# Patient Record
Sex: MALE | Race: WHITE | NOT HISPANIC OR LATINO | URBAN - METROPOLITAN AREA
[De-identification: names, ages, dates, MRNs, and addresses within clinical notes are randomized per-mention and may not be internally consistent; named-entity substitution may affect disease eponyms.]

---

## 2023-03-31 ENCOUNTER — OFFICE VISIT (OUTPATIENT)
Dept: PEDIATRICS | Facility: CLINIC | Age: 23
End: 2023-03-31
Payer: COMMERCIAL

## 2023-03-31 VITALS — HEART RATE: 86 BPM | DIASTOLIC BLOOD PRESSURE: 73 MMHG | SYSTOLIC BLOOD PRESSURE: 127 MMHG

## 2023-03-31 DIAGNOSIS — S09.90XA MINOR HEAD INJURY, INITIAL ENCOUNTER: Primary | ICD-10-CM

## 2023-03-31 PROBLEM — G80.1 SPASTIC DIPLEGIC CEREBRAL PALSY (MULTI): Status: ACTIVE | Noted: 2023-03-31

## 2023-03-31 PROBLEM — J45.909 ASTHMA (HHS-HCC): Status: ACTIVE | Noted: 2023-03-31

## 2023-03-31 PROBLEM — F39 MOOD DISORDER (CMS-HCC): Status: RESOLVED | Noted: 2020-01-22 | Resolved: 2023-03-31

## 2023-03-31 PROBLEM — L02.611 CELLULITIS AND ABSCESS OF TOE OF RIGHT FOOT: Status: RESOLVED | Noted: 2020-01-22 | Resolved: 2023-03-31

## 2023-03-31 PROBLEM — F41.9 ANXIETY DISORDER: Status: ACTIVE | Noted: 2023-03-31

## 2023-03-31 PROBLEM — H10.45 CHRONIC ALLERGIC CONJUNCTIVITIS: Status: ACTIVE | Noted: 2023-03-31

## 2023-03-31 PROBLEM — J30.9 ALLERGIC RHINITIS, UNSPECIFIED: Status: RESOLVED | Noted: 2017-07-11 | Resolved: 2023-03-31

## 2023-03-31 PROBLEM — G47.10 HYPERSOMNIA: Status: ACTIVE | Noted: 2023-03-31

## 2023-03-31 PROBLEM — F84.0 AUTISM SPECTRUM DISORDER (HHS-HCC): Status: ACTIVE | Noted: 2023-03-31

## 2023-03-31 PROBLEM — G96.00 CSF LEAK: Status: RESOLVED | Noted: 2018-08-05 | Resolved: 2023-03-31

## 2023-03-31 PROBLEM — F42.9 OBSESSIVE COMPULSIVE DISORDER: Status: ACTIVE | Noted: 2023-03-31

## 2023-03-31 PROBLEM — J30.9 ALLERGIC RHINITIS: Status: ACTIVE | Noted: 2023-03-31

## 2023-03-31 PROBLEM — L03.031 CELLULITIS AND ABSCESS OF TOE OF RIGHT FOOT: Status: RESOLVED | Noted: 2020-01-22 | Resolved: 2023-03-31

## 2023-03-31 PROBLEM — K59.00 CONSTIPATION: Status: ACTIVE | Noted: 2023-03-31

## 2023-03-31 PROBLEM — N50.0 ATROPHIC TESTICLE: Status: ACTIVE | Noted: 2023-03-31

## 2023-03-31 PROBLEM — J45.30 MILD PERSISTENT ASTHMA, WELL CONTROLLED (HHS-HCC): Status: ACTIVE | Noted: 2017-07-11

## 2023-03-31 PROCEDURE — 99213 OFFICE O/P EST LOW 20 MIN: CPT | Performed by: PEDIATRICS

## 2023-03-31 RX ORDER — BACLOFEN 20 MG/1
TABLET ORAL
COMMUNITY
Start: 2018-10-26 | End: 2023-05-11 | Stop reason: SDUPTHER

## 2023-03-31 RX ORDER — TRIAMCINOLONE ACETONIDE 55 UG/1
SPRAY, METERED NASAL
COMMUNITY
Start: 2018-04-17 | End: 2023-10-16 | Stop reason: WASHOUT

## 2023-03-31 RX ORDER — HYDROCODONE BITARTRATE AND ACETAMINOPHEN 7.5; 325 MG/15ML; MG/15ML
5 SOLUTION ORAL
COMMUNITY
Start: 2015-06-04 | End: 2023-10-16 | Stop reason: WASHOUT

## 2023-03-31 RX ORDER — POLYETHYLENE GLYCOL 3350 17 G/17G
POWDER, FOR SOLUTION ORAL
COMMUNITY
Start: 2022-11-08 | End: 2023-04-28 | Stop reason: SDUPTHER

## 2023-03-31 RX ORDER — SERTRALINE HYDROCHLORIDE 100 MG/1
TABLET, FILM COATED ORAL
COMMUNITY
Start: 2020-06-18 | End: 2023-10-05 | Stop reason: SDUPTHER

## 2023-03-31 RX ORDER — PSEUDOEPH/DM/GUAIFEN/ACETAMIN 30-10-324
EXPECTORANT ORAL
COMMUNITY
Start: 2022-06-27 | End: 2023-10-16 | Stop reason: WASHOUT

## 2023-03-31 RX ORDER — AZELASTINE 1 MG/ML
SPRAY, METERED NASAL EVERY 12 HOURS PRN
COMMUNITY
Start: 2017-07-11 | End: 2023-10-16 | Stop reason: WASHOUT

## 2023-03-31 RX ORDER — TOOTHBRUSH
EACH DENTAL
COMMUNITY
Start: 2022-06-28 | End: 2024-03-12 | Stop reason: SDUPTHER

## 2023-03-31 RX ORDER — ALBUTEROL SULFATE 90 UG/1
2 AEROSOL, METERED RESPIRATORY (INHALATION) EVERY 4 HOURS PRN
COMMUNITY
Start: 2013-07-26 | End: 2023-10-16 | Stop reason: WASHOUT

## 2023-03-31 RX ORDER — FLUVOXAMINE MALEATE 50 MG/1
50 TABLET, FILM COATED ORAL 2 TIMES DAILY
COMMUNITY
Start: 2017-06-05 | End: 2023-10-05 | Stop reason: WASHOUT

## 2023-03-31 RX ORDER — BISMUTH SUBSALICYLATE 525 MG/30ML
LIQUID ORAL
COMMUNITY
Start: 2022-06-27 | End: 2023-10-16 | Stop reason: WASHOUT

## 2023-03-31 RX ORDER — DOCUSATE SODIUM 100 MG/1
CAPSULE, LIQUID FILLED ORAL
COMMUNITY
End: 2023-04-18 | Stop reason: SDUPTHER

## 2023-03-31 RX ORDER — DIMETHICONE, CAMPHOR (SYNTHETIC), MENTHOL, AND PHENOL 1.1; .5; .625; .5 G/100G; G/100G; G/100G; G/100G
OINTMENT TOPICAL
COMMUNITY
Start: 2022-06-28 | End: 2024-03-12 | Stop reason: SDUPTHER

## 2023-03-31 RX ORDER — DIPHENHYDRAMINE HCL 25 MG/1
TABLET ORAL
COMMUNITY
Start: 2022-06-27 | End: 2024-03-04 | Stop reason: WASHOUT

## 2023-03-31 RX ORDER — GABAPENTIN 100 MG/1
CAPSULE ORAL
COMMUNITY
Start: 2021-02-01 | End: 2023-10-05 | Stop reason: SDUPTHER

## 2023-03-31 RX ORDER — ASCORBIC ACID 1000 MG
TABLET ORAL
COMMUNITY
Start: 2022-06-28 | End: 2024-03-12 | Stop reason: SDUPTHER

## 2023-03-31 RX ORDER — BENZOCAINE 20 %
PASTE (GRAM) MUCOUS MEMBRANE
COMMUNITY
Start: 2022-06-28 | End: 2024-03-12 | Stop reason: SDUPTHER

## 2023-03-31 RX ORDER — MONTELUKAST SODIUM 10 MG/1
TABLET ORAL
COMMUNITY
Start: 2018-08-24 | End: 2023-05-15 | Stop reason: SDUPTHER

## 2023-03-31 RX ORDER — DIAZEPAM 2 MG/1
2 TABLET ORAL
COMMUNITY
Start: 2015-06-08 | End: 2023-10-16 | Stop reason: WASHOUT

## 2023-03-31 RX ORDER — CLOTRIMAZOLE 1 %
CREAM (GRAM) TOPICAL
COMMUNITY
Start: 2022-06-27 | End: 2023-10-16 | Stop reason: WASHOUT

## 2023-03-31 RX ORDER — GUAIFENESIN AND DEXTROMETHORPHAN HYDROBROMIDE 600; 30 MG/1; MG/1
TABLET, EXTENDED RELEASE ORAL
COMMUNITY
Start: 2022-06-27 | End: 2024-03-12 | Stop reason: SDUPTHER

## 2023-03-31 RX ORDER — ACETAMINOPHEN 325 MG/1
TABLET ORAL
COMMUNITY
Start: 2022-06-27 | End: 2024-03-12 | Stop reason: SDUPTHER

## 2023-04-17 NOTE — PROGRESS NOTES
Called patient and informed her.  She is scheduled for 4/18/2023 at 4/19/2023 for a virtual. Subjective   Patient ID: Paco Adams is a 22 y.o. male who presents for Concussion (Fell and hit, vom).  Here with manager from group home  2 nights ago, fell while in tub, hit head.  No LOC.  Had one bout of vomting 1/2 hr after episode.  No vomitng since  Sleeping well.  Did normal activities yesterday      Objective   /73   Pulse 86   BSA: There is no height or weight on file to calculate BSA.  Growth percentiles: Facility age limit for growth %piter is 20 years. Facility age limit for growth %piter is 20 years.     Physical Exam  PERRL, EOMI.  Ears nl.  No adenpathy. No bony tenderness or step off periorbital area.  No bruising, swelling    Assessment/Plan minor head trauma.  Has done well in past 36hours and reassuring exam  Supportive care  Tests ordered:  No orders of the defined types were placed in this encounter.    Tests reviewed:  Prescription drug management:     Pio Henry MD

## 2023-04-18 DIAGNOSIS — K59.00 CONSTIPATION, UNSPECIFIED CONSTIPATION TYPE: Primary | ICD-10-CM

## 2023-04-18 RX ORDER — DOCUSATE SODIUM 100 MG/1
CAPSULE, LIQUID FILLED ORAL
Qty: 60 CAPSULE | Refills: 11 | Status: SHIPPED | OUTPATIENT
Start: 2023-04-18 | End: 2024-03-12 | Stop reason: SDUPTHER

## 2023-04-26 ENCOUNTER — TELEPHONE (OUTPATIENT)
Dept: PEDIATRICS | Facility: CLINIC | Age: 23
End: 2023-04-26
Payer: COMMERCIAL

## 2023-04-26 DIAGNOSIS — K59.00 CONSTIPATION, UNSPECIFIED CONSTIPATION TYPE: Primary | ICD-10-CM

## 2023-04-28 RX ORDER — POLYETHYLENE GLYCOL 3350 17 G/17G
POWDER, FOR SOLUTION ORAL
Qty: 850 G | Refills: 11 | Status: SHIPPED | OUTPATIENT
Start: 2023-04-28 | End: 2024-03-12 | Stop reason: SDUPTHER

## 2023-05-03 DIAGNOSIS — J30.89 SEASONAL ALLERGIC RHINITIS DUE TO OTHER ALLERGIC TRIGGER: ICD-10-CM

## 2023-05-03 RX ORDER — CETIRIZINE HYDROCHLORIDE 10 MG/1
TABLET ORAL
Qty: 90 TABLET | Refills: 1 | Status: SHIPPED | OUTPATIENT
Start: 2023-05-03 | End: 2024-03-12 | Stop reason: SDUPTHER

## 2023-05-05 ENCOUNTER — TELEPHONE (OUTPATIENT)
Dept: PEDIATRICS | Facility: CLINIC | Age: 23
End: 2023-05-05
Payer: COMMERCIAL

## 2023-05-05 NOTE — TELEPHONE ENCOUNTER
Discussion about incident.  He did have vomiting x1 after incident.  I told person from group [facility] that that symptoms is concerning for more serious intracranial injury and in general warrents notification of a physician to help decide disposition

## 2023-05-11 DIAGNOSIS — G80.1 SPASTIC DIPLEGIC CEREBRAL PALSY (MULTI): Primary | ICD-10-CM

## 2023-05-11 RX ORDER — BACLOFEN 20 MG/1
20 TABLET ORAL 3 TIMES DAILY
Qty: 90 TABLET | Refills: 11 | Status: SHIPPED | OUTPATIENT
Start: 2023-05-11 | End: 2024-03-12 | Stop reason: SDUPTHER

## 2023-05-15 ENCOUNTER — OFFICE VISIT (OUTPATIENT)
Dept: PRIMARY CARE | Facility: CLINIC | Age: 23
End: 2023-05-15
Payer: COMMERCIAL

## 2023-05-15 VITALS
TEMPERATURE: 97.6 F | BODY MASS INDEX: 24.32 KG/M2 | HEIGHT: 65 IN | WEIGHT: 146 LBS | OXYGEN SATURATION: 94 % | DIASTOLIC BLOOD PRESSURE: 81 MMHG | SYSTOLIC BLOOD PRESSURE: 132 MMHG | HEART RATE: 90 BPM

## 2023-05-15 DIAGNOSIS — J45.909 ASTHMA, UNSPECIFIED ASTHMA SEVERITY, UNSPECIFIED WHETHER COMPLICATED, UNSPECIFIED WHETHER PERSISTENT (HHS-HCC): ICD-10-CM

## 2023-05-15 DIAGNOSIS — J30.9 ALLERGIC RHINITIS, UNSPECIFIED SEASONALITY, UNSPECIFIED TRIGGER: Primary | ICD-10-CM

## 2023-05-15 DIAGNOSIS — F84.0 AUTISM SPECTRUM DISORDER (HHS-HCC): ICD-10-CM

## 2023-05-15 DIAGNOSIS — Z00.00 HEALTHCARE MAINTENANCE: ICD-10-CM

## 2023-05-15 DIAGNOSIS — G80.1 SPASTIC DIPLEGIC CEREBRAL PALSY (MULTI): ICD-10-CM

## 2023-05-15 PROCEDURE — 99395 PREV VISIT EST AGE 18-39: CPT | Performed by: STUDENT IN AN ORGANIZED HEALTH CARE EDUCATION/TRAINING PROGRAM

## 2023-05-15 PROCEDURE — 1036F TOBACCO NON-USER: CPT | Performed by: STUDENT IN AN ORGANIZED HEALTH CARE EDUCATION/TRAINING PROGRAM

## 2023-05-15 RX ORDER — MONTELUKAST SODIUM 10 MG/1
10 TABLET ORAL NIGHTLY
Qty: 90 TABLET | Refills: 3 | Status: SHIPPED | OUTPATIENT
Start: 2023-05-15 | End: 2024-03-12 | Stop reason: SDUPTHER

## 2023-05-15 RX ORDER — FLUTICASONE PROPIONATE 110 UG/1
2 AEROSOL, METERED RESPIRATORY (INHALATION) EVERY 12 HOURS PRN
COMMUNITY
Start: 2017-07-11 | End: 2024-03-12 | Stop reason: SDUPTHER

## 2023-05-15 NOTE — PROGRESS NOTES
"Subjective   Patient ID: Paco Adams is a 23 y.o. male who presents for Establish Care.    HPI   Former patient of Dr. Pio Henry, now establishing with an adult provider instead of pediatrics. Grateful for the referral.     Complex Pmhx including diplegic cerebral palsy, autism, OCD/anxiety    Re: CP - see PM+R notes. Needs new bilateral AFOs to help him walk. Used to have baclofen pump; this was complicated by infection requiring surgery, now just on oral baclofen and gets therapy regularly. Has myoclonus on exam and needs walking crutches and AFOs to ambulate.    Re: Psych - h/o autism. On several medications, follows with Dr. Curtis. Stable on current medications. Lives in group home, has essentially 24hr care.    Re: Other - has a small rash on dorsal aspect of foot, appears to be athelete's foot. Also needs refills on his seasonal allergy/asthma medications    Shots are current.     Review of Systems    Objective   /81   Pulse 90   Temp 36.4 °C (97.6 °F)   Ht 1.651 m (5' 5\")   Wt 66.2 kg (146 lb)   SpO2 94%   BMI 24.30 kg/m²     Physical Exam  Gen: well developed in NAD. AAO x3.   HEENT: NC/AT. Anicteric sclera, symmetric pupils. MMM no thrush.  Neck: Soft, supple. No LAD. No goiter.   CV: RRR nl s1s2 no m/r/g  Pulm: CTAB no w/r/r, good air exchange  GI: soft NTND BS+ no hsm. Well healed scars from prior surgeries.   Ext: WWP no edema  Neuro: brisk bilateral reflexes UE and LE. Myoclonus on ankle jerk bilaterally   MSK: Spastic muscles throughout, worse LE.   Gait:  unsteady gait with crutches and AFOs  Psych: fair insight. Tangential discussions however.    Assessment/Plan   Longstanding chronic issues stemming from CP, autism, and OCD/anxiety. Well managed on current meds. Meds renewed. Lab work ordered. Shots up to date. Paperwork filled out.     Problem List Items Addressed This Visit       Allergic rhinitis - Primary    Relevant Medications    montelukast (Singulair) 10 mg " tablet    Asthma    Relevant Medications    montelukast (Singulair) 10 mg tablet    Autism spectrum disorder    Spastic diplegic cerebral palsy (CMS/HCC)     Other Visit Diagnoses       Healthcare maintenance        Relevant Orders    CBC and Auto Differential    Comprehensive Metabolic Panel    Lipid Panel

## 2023-05-15 NOTE — PATIENT INSTRUCTIONS
Please stop at the lab ([address]) to complete your blood and/or urine work that I've ordered for you.    I will contact you with the results at my soonest convenience. I strongly urge you to use [employer] as this is the quickest and easiest way to access your results and recieve my correspondences.    I have renewed your medications today     I have provided hand scripts for AFOs and for the appropriate dosing of miralax

## 2023-08-29 RX ORDER — CICLOPIROX 80 MG/ML
SOLUTION TOPICAL NIGHTLY
COMMUNITY
Start: 2023-07-24 | End: 2023-10-16 | Stop reason: WASHOUT

## 2023-08-29 RX ORDER — LORAZEPAM 1 MG/1
1 TABLET ORAL NIGHTLY
COMMUNITY
Start: 2023-08-07 | End: 2023-10-05 | Stop reason: SDUPTHER

## 2023-10-03 ENCOUNTER — APPOINTMENT (OUTPATIENT)
Dept: PRIMARY CARE | Facility: CLINIC | Age: 23
End: 2023-10-03
Payer: COMMERCIAL

## 2023-10-05 ENCOUNTER — TELEMEDICINE (OUTPATIENT)
Dept: BEHAVIORAL HEALTH | Facility: CLINIC | Age: 23
End: 2023-10-05
Payer: COMMERCIAL

## 2023-10-05 DIAGNOSIS — G80.1 SPASTIC DIPLEGIC CEREBRAL PALSY (MULTI): ICD-10-CM

## 2023-10-05 DIAGNOSIS — F42.8 OTHER OBSESSIVE-COMPULSIVE DISORDERS: ICD-10-CM

## 2023-10-05 DIAGNOSIS — F41.1 GENERALIZED ANXIETY DISORDER: Primary | ICD-10-CM

## 2023-10-05 DIAGNOSIS — F84.0 AUTISM SPECTRUM DISORDER (HHS-HCC): ICD-10-CM

## 2023-10-05 PROBLEM — J45.909 ASTHMA (HHS-HCC): Status: ACTIVE | Noted: 2017-07-11

## 2023-10-05 PROCEDURE — 99215 OFFICE O/P EST HI 40 MIN: CPT | Performed by: PSYCHIATRY & NEUROLOGY

## 2023-10-05 RX ORDER — LORAZEPAM 1 MG/1
1 TABLET ORAL NIGHTLY
Qty: 31 TABLET | Refills: 2 | Status: SHIPPED | OUTPATIENT
Start: 2023-10-05 | End: 2023-12-04 | Stop reason: SDUPTHER

## 2023-10-05 RX ORDER — SERTRALINE HYDROCHLORIDE 100 MG/1
50 TABLET, FILM COATED ORAL 3 TIMES DAILY
Qty: 47 TABLET | Refills: 2 | Status: SHIPPED | OUTPATIENT
Start: 2023-10-05 | End: 2023-12-04 | Stop reason: SDUPTHER

## 2023-10-05 RX ORDER — GABAPENTIN 100 MG/1
200 CAPSULE ORAL 2 TIMES DAILY
Qty: 124 CAPSULE | Refills: 2 | Status: SHIPPED | OUTPATIENT
Start: 2023-10-05 | End: 2023-12-04 | Stop reason: SDUPTHER

## 2023-10-05 ASSESSMENT — ENCOUNTER SYMPTOMS
SPEECH DIFFICULTY: 0
TREMORS: 0
SLEEP DISTURBANCE: 0
AGITATION: 0
ABDOMINAL PAIN: 0
SEIZURES: 0
ACTIVITY CHANGE: 0
SHORTNESS OF BREATH: 0

## 2023-10-05 NOTE — PATIENT INSTRUCTIONS
Evert seems to be doing well lately - no plans to make treatment changes.    Evert needs to have a routine urine drug screen done for prescribing of Ativan.    Next appointment: Monday 12/04/2023 at 10:30 AM virtual.

## 2023-10-05 NOTE — PROGRESS NOTES
A HIPAA-compliant interactive audio and video telecommunication system which permits real time communications between the patient (at the originating site) and provider (at the distant site) was utilized to provide this telehealth service.     The patient, family, caregivers and guardian (as appropriate) have provided consent on this date to conduct treatment via this telehealth service.  The patient's identity and physical location were verified at the time of this visit.      Present for appointment: Evert and [facility] staff ([place]).    SUBJECTIVE    No significant health problems for Evert since we last met.  He has remained on the scheduled/daily dose of lorazepam 1mg at bedtime since our last appointment and the general report from his family and group home staff is that he is doing very well lately.  He seems to be in a great mood, his anxiety is very minimal, and he is sleeping better overall.  It does not seem that these benefits have decreased with the duration of treatment.  He is still attending day program 4 days per week.  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  Though he does not participate in [employer] he will go with his house-mate on Wednesdays and socializes with others that are there.  Evert says he likes his home, his staff, and his room-mate.   He still does things through [employer]: online [employer] club [employer].    The most significant change for him is that he has to wake up almost 1.5 hours earlier on the days he goes to day program due to a change in transportation provider, so he's sometimes a little more tired in the mornings than before, but otherwise has adapted well to the change.    Review of Systems   Constitutional:  Negative for activity change.   Respiratory:  Negative for shortness of breath.    Cardiovascular:  Negative for chest pain.   Gastrointestinal:  Negative for abdominal pain.    Neurological:  Negative for tremors, seizures and speech difficulty.   Psychiatric/Behavioral:  Negative for agitation, behavioral problems and sleep disturbance.       Controlled Substance Evaluation  OARRS/PDMP reviewed: Tenzin Brown MD on 10/5/2023 11:31 AM  Is the patient prescribed a combination of a benzodiazepine and opioid? No  I have personally reviewed the OARRS report for Paco Adams.   I have considered the risks of abuse, dependence, addiction and diversion.    I believe that it is clinically appropriate for Paco Adams to be prescribed this medication.    Last Urine Drug Screen: Ordered 10/05/2023    Controlled Substance Agreement: 8/08/2023  Reviewed Controlled Substance Agreement, including but not limited to:  Benefits, risks, and alternatives to treatment with a controlled substance medication(s).    Prescribed Controlled Substances: Lorazepam  What is the patient's goal of therapy? Sleep, anxiety  Is this being achieved with current treatment? Yes  Activities of Daily Living:   Is your overall impression that this patient is benefiting (symptom reduction outweighs side effects) from therapy? Yes  1. Physical Functioning: Same  2. Family Relationship: Better  3. Social Relationship: Better  4. Mood: Better  5. Sleep Patterns: Better  6. 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   Bx Specialist ?   Nicotine None   Alcohol None   Other drugs None     OBJECTIVE    Lab Results   Component Value Date    HGB 15.3 05/07/2019     05/07/2019    NEUTROABS 2.89 05/07/2019    CHOL 148 05/07/2019    LDLF 80 05/07/2019    TRIG 132  05/07/2019     Therapeutic Drug Monitoring  N/A    Electrocardiograms  None in chart    MENTAL STATUS EXAM  General/Appearance: Appropriate dress/hygiene/grooming.  Attitude/Behavior: Actively engages with interview.  Speech/Communication: Hypertalkative. Interrupts or talks over others. Pressured.  Motor: Hyperactive. Stereotypies.  Gait: Not assessed at this visit.  Mood:  Seems to be very happy/excited today.  Affect: Euphoric. Expansive.  Thought processes: Perseverative.  Thought content: Obsessions. Talks about various elevator-filming scenarios.  Perception: Does not appear to be experiencing or responding to hallucinations.  Sensorium/Cognition: Alert, awake, oriented to person/place/time. Difficulty shifting focus/attention between topics.  Memory: Recent & remote recall is intact.  Insight: Minimal.  Judgment: Fair.    ASSESSMENT  Evert has been doing much better since resuming regular use of bedtime lorazepam.  He is sleeping better, and his mood and anxiety levels during the daytime have also improved.  While we may not want to keep him on this treatment long-term, for now it seems quite helpful.  Other options we could consider in place of the lorazepam might be a trial of an alpha agonist (clonidine or guanfacine) in the future.  I have ordered a UDS for ongoing prescribing of the lorazepam.    PROBLEM LIST  1. ASD  2. BIANCA  3. OCD    PLAN  -- Continue lorazepam 1mg at bedtime for anxiety and OCD  -- Continue sertraline 50mg TID for anxiety and OCD  -- Continue gabapentin 200mg BID for anxiety  -- Follow up 2 months

## 2023-10-16 ENCOUNTER — OFFICE VISIT (OUTPATIENT)
Dept: PRIMARY CARE | Facility: CLINIC | Age: 23
End: 2023-10-16
Payer: COMMERCIAL

## 2023-10-16 VITALS
TEMPERATURE: 98.7 F | SYSTOLIC BLOOD PRESSURE: 126 MMHG | OXYGEN SATURATION: 95 % | HEART RATE: 65 BPM | DIASTOLIC BLOOD PRESSURE: 81 MMHG

## 2023-10-16 DIAGNOSIS — G80.1 SPASTIC DIPLEGIC CEREBRAL PALSY (MULTI): ICD-10-CM

## 2023-10-16 DIAGNOSIS — R21 RASH: Primary | ICD-10-CM

## 2023-10-16 DIAGNOSIS — J45.909 ASTHMA, UNSPECIFIED ASTHMA SEVERITY, UNSPECIFIED WHETHER COMPLICATED, UNSPECIFIED WHETHER PERSISTENT (HHS-HCC): ICD-10-CM

## 2023-10-16 DIAGNOSIS — F84.0 AUTISM SPECTRUM DISORDER (HHS-HCC): ICD-10-CM

## 2023-10-16 PROCEDURE — 90471 IMMUNIZATION ADMIN: CPT | Performed by: STUDENT IN AN ORGANIZED HEALTH CARE EDUCATION/TRAINING PROGRAM

## 2023-10-16 PROCEDURE — 99214 OFFICE O/P EST MOD 30 MIN: CPT | Performed by: STUDENT IN AN ORGANIZED HEALTH CARE EDUCATION/TRAINING PROGRAM

## 2023-10-16 PROCEDURE — 90686 IIV4 VACC NO PRSV 0.5 ML IM: CPT | Performed by: STUDENT IN AN ORGANIZED HEALTH CARE EDUCATION/TRAINING PROGRAM

## 2023-10-16 PROCEDURE — 1036F TOBACCO NON-USER: CPT | Performed by: STUDENT IN AN ORGANIZED HEALTH CARE EDUCATION/TRAINING PROGRAM

## 2023-10-16 RX ORDER — TRIAMCINOLONE ACETONIDE 1 MG/G
CREAM TOPICAL 2 TIMES DAILY
Qty: 453.6 G | Refills: 0 | Status: SHIPPED | OUTPATIENT
Start: 2023-10-16 | End: 2023-10-18 | Stop reason: SDUPTHER

## 2023-10-16 NOTE — PROGRESS NOTES
Subjective   Patient ID: Paco Adams is a 23 y.o. male who presents for No chief complaint on file..    HPI   Complex Pmhx including diplegic cerebral palsy, autism, OCD/anxiety    Doing well since last in. Needs refills on a few chronic meds.      Re: CP - see PM+R notes. Needs new bilateral AFOs to help him walk. Used to have baclofen pump; this was complicated by infection requiring surgery, now just on oral baclofen and gets therapy regularly. Has myoclonus on exam and needs walking crutches and AFOs to ambulate.     Re: Psych - h/o autism. On several medications, follows with Dr. Curtis. Stable on current medications. Lives in group home, has essentially 24hr care.    PMHx, FHx, Social Hx, Surg Hx personally reviewed at this appointment. No pertinent findings and/or changes from prior (if applicable)..    ROS: Denies wt gain/loss f/c HA LoC CP SOB NVDC. See HPI above, and scanned sheet (if applicable). All other systems are reviewed and are without complaint.     Review of Systems    Objective   /81   Pulse 65   Temp 37.1 °C (98.7 °F)   SpO2 95%     Physical Exam  Gen: well developed in NAD. AAO x3.   HEENT: NC/AT. Anicteric sclera, symmetric pupils. MMM no thrush.  Neck: Soft, supple. No LAD. No goiter.   CV: RRR nl s1s2 no m/r/g  Pulm: CTAB no w/r/r, good air exchange  GI: soft NTND BS+ no hsm. Well healed scars from prior surgeries.   Ext: WWP no edema  Neuro: brisk bilateral reflexes UE and LE. Myoclonus on ankle jerk bilaterally   MSK: Spastic muscles throughout, worse LE.   Gait:  unsteady gait with crutches and AFOs  Psych: fair insight. Tangential discussions however.  Skin: two areas (back of head, R foot dorsal aspect) with mild dermatitis.     Lab Results   Component Value Date    WBC 5.3 05/07/2019    HGB 15.3 05/07/2019    HCT 45.4 05/07/2019     05/07/2019    CHOL 148 05/07/2019    TRIG 132 05/07/2019    HDL 41.8 05/07/2019     par          Assessment/Plan    Longstanding chronic issues stemming from CP, autism, and OCD/anxiety. Well managed on current meds. Dermatitis will be treated with a steroid cream. Meds renewed. Lab work ordered at prior appointment, to go to lab to get this done. Flu shot given. Paperwork filled out.      # Dermatitis  - steroid cream prescribed  - referral to dermatology if does not improve with this    # Cerebral Palsy with spasticity  - continue current meds  - use AFOs to walk  - follow up PM&R    # Depression and/or Anxiety  - continue current meds  - follow up psych     # Health Maintenance  - routine blood work  - Colon Cancer Screening: not indicated   - PSA:  not indicated   - Immunizations:  flu shot  - AAA screening:  not indicated          Patient was identified as a fall risk. Risk prevention instructions provided.

## 2023-10-16 NOTE — PATIENT INSTRUCTIONS
Please stop at the lab ([address]) to complete your blood and/or urine work that I've ordered for you.    I will contact you with the results at my soonest convenience. I strongly urge you to use [employer] as this is the quickest and easiest way to access your results and receive my correspondences.     You received your flu shot today!    I have renewed your chronic medications today, and given you a steroid cream.      Ways to Help Prevent Falls at Home    Quick Tips   ? Ask for help if you need it. Most people want to help!   ? Get up slowly after sitting or laying down   ? Wear a medical alert device or keep cell phone in your pocket   ? Use night lights, especially areas near a bathroom   ? Keep the items you use often within reach on a small stool or end table   ? Use an assistive device such as walker or cane, as directed by provider/physical therapy   ? Use a non-slip mat and grab bars in your bathroom. Look for home health sections for best options     Other Areas to Focus On   ? Exercise and nutrition: Regular exercise or taking a falls prevention class are great ways improve strength and balance. Don’t forget to stay hydrated and bring a snack!   ? Medicine side effects: Some medicines can make you sleepy or dizzy, which could cause a fall. Ask your healthcare provider about the side effects your medicines could cause. Be sure to let them know if you take any vitamins or supplements as well.   ? Tripping hazards: Remove items you could trip on, such as loose mats, rugs, cords, and clutter. Wear closed toe shoes with rubber soles.   ? Health and wellness: Get regular checkups with your healthcare provider, plus routine vision and hearing screenings. Talk with your healthcare provider about:   o Your medicines and the possible side effects - bring them in a bag if that is easier!   o Problems with balance or feeling dizzy   o Ways to promote bone health, such as Vitamin D and calcium supplements   o  Questions or concerns about falling     *Ask your healthcare team if you have questions     ©[facility], 2022

## 2023-10-18 ENCOUNTER — LAB (OUTPATIENT)
Dept: LAB | Facility: LAB | Age: 23
End: 2023-10-18
Payer: COMMERCIAL

## 2023-10-18 DIAGNOSIS — Z00.00 HEALTHCARE MAINTENANCE: ICD-10-CM

## 2023-10-18 DIAGNOSIS — F41.1 GENERALIZED ANXIETY DISORDER: ICD-10-CM

## 2023-10-18 DIAGNOSIS — R21 RASH: ICD-10-CM

## 2023-10-18 LAB
ALBUMIN SERPL BCP-MCNC: 4.7 G/DL (ref 3.4–5)
ALP SERPL-CCNC: 61 U/L (ref 33–120)
ALT SERPL W P-5'-P-CCNC: 15 U/L (ref 10–52)
AMPHETAMINES UR QL SCN: NORMAL
ANION GAP SERPL CALC-SCNC: 11 MMOL/L (ref 10–20)
AST SERPL W P-5'-P-CCNC: 19 U/L (ref 9–39)
BARBITURATES UR QL SCN: NORMAL
BASOPHILS # BLD AUTO: 0.02 X10*3/UL (ref 0–0.1)
BASOPHILS NFR BLD AUTO: 0.6 %
BILIRUB SERPL-MCNC: 0.6 MG/DL (ref 0–1.2)
BUN SERPL-MCNC: 12 MG/DL (ref 6–23)
BZE UR QL SCN: NORMAL
CALCIUM SERPL-MCNC: 9.7 MG/DL (ref 8.6–10.3)
CANNABINOIDS UR QL SCN: NORMAL
CHLORIDE SERPL-SCNC: 105 MMOL/L (ref 98–107)
CHOLEST SERPL-MCNC: 175 MG/DL (ref 0–199)
CHOLESTEROL/HDL RATIO: 3.5
CO2 SERPL-SCNC: 26 MMOL/L (ref 21–32)
CREAT SERPL-MCNC: 0.68 MG/DL (ref 0.5–1.3)
CREAT UR-MCNC: 127.2 MG/DL (ref 20–370)
EOSINOPHIL # BLD AUTO: 0.05 X10*3/UL (ref 0–0.7)
EOSINOPHIL NFR BLD AUTO: 1.4 %
ERYTHROCYTE [DISTWIDTH] IN BLOOD BY AUTOMATED COUNT: 12.7 % (ref 11.5–14.5)
GFR SERPL CREATININE-BSD FRML MDRD: >90 ML/MIN/1.73M*2
GLUCOSE SERPL-MCNC: 85 MG/DL (ref 74–99)
HCT VFR BLD AUTO: 49.6 % (ref 41–52)
HDLC SERPL-MCNC: 50.7 MG/DL
HGB BLD-MCNC: 16.5 G/DL (ref 13.5–17.5)
IMM GRANULOCYTES # BLD AUTO: 0.01 X10*3/UL (ref 0–0.7)
IMM GRANULOCYTES NFR BLD AUTO: 0.3 % (ref 0–0.9)
LDLC SERPL CALC-MCNC: 113 MG/DL
LYMPHOCYTES # BLD AUTO: 0.84 X10*3/UL (ref 1.2–4.8)
LYMPHOCYTES NFR BLD AUTO: 23.5 %
MCH RBC QN AUTO: 31.1 PG (ref 26–34)
MCHC RBC AUTO-ENTMCNC: 33.3 G/DL (ref 32–36)
MCV RBC AUTO: 94 FL (ref 80–100)
MONOCYTES # BLD AUTO: 0.42 X10*3/UL (ref 0.1–1)
MONOCYTES NFR BLD AUTO: 11.8 %
NEUTROPHILS # BLD AUTO: 2.23 X10*3/UL (ref 1.2–7.7)
NEUTROPHILS NFR BLD AUTO: 62.4 %
NON HDL CHOLESTEROL: 124 MG/DL (ref 0–149)
NRBC BLD-RTO: 0 /100 WBCS (ref 0–0)
PCP UR QL SCN: NORMAL
PLATELET # BLD AUTO: 253 X10*3/UL (ref 150–450)
PMV BLD AUTO: 11.1 FL (ref 7.5–11.5)
POTASSIUM SERPL-SCNC: 4 MMOL/L (ref 3.5–5.3)
PROT SERPL-MCNC: 7.2 G/DL (ref 6.4–8.2)
RBC # BLD AUTO: 5.3 X10*6/UL (ref 4.5–5.9)
SODIUM SERPL-SCNC: 138 MMOL/L (ref 136–145)
TRIGL SERPL-MCNC: 57 MG/DL (ref 0–149)
VLDL: 11 MG/DL (ref 0–40)
WBC # BLD AUTO: 3.6 X10*3/UL (ref 4.4–11.3)

## 2023-10-18 PROCEDURE — 80061 LIPID PANEL: CPT

## 2023-10-18 PROCEDURE — 80354 DRUG SCREENING FENTANYL: CPT

## 2023-10-18 PROCEDURE — 80365 DRUG SCREENING OXYCODONE: CPT

## 2023-10-18 PROCEDURE — 80368 SEDATIVE HYPNOTICS: CPT

## 2023-10-18 PROCEDURE — 80361 OPIATES 1 OR MORE: CPT

## 2023-10-18 PROCEDURE — 85025 COMPLETE CBC W/AUTO DIFF WBC: CPT

## 2023-10-18 PROCEDURE — 80358 DRUG SCREENING METHADONE: CPT

## 2023-10-18 PROCEDURE — 80307 DRUG TEST PRSMV CHEM ANLYZR: CPT

## 2023-10-18 PROCEDURE — 82570 ASSAY OF URINE CREATININE: CPT

## 2023-10-18 PROCEDURE — 80373 DRUG SCREENING TRAMADOL: CPT

## 2023-10-18 PROCEDURE — 80053 COMPREHEN METABOLIC PANEL: CPT

## 2023-10-18 PROCEDURE — 36415 COLL VENOUS BLD VENIPUNCTURE: CPT

## 2023-10-18 PROCEDURE — 80346 BENZODIAZEPINES1-12: CPT

## 2023-10-18 RX ORDER — TRIAMCINOLONE ACETONIDE 1 MG/G
CREAM TOPICAL 2 TIMES DAILY
Qty: 453.6 G | Refills: 0 | Status: SHIPPED | OUTPATIENT
Start: 2023-10-18 | End: 2023-10-24

## 2023-10-19 DIAGNOSIS — R21 RASH: ICD-10-CM

## 2023-10-24 RX ORDER — TRIAMCINOLONE ACETONIDE 1 MG/G
CREAM TOPICAL 2 TIMES DAILY
Qty: 400 G | Refills: 1 | Status: SHIPPED | OUTPATIENT
Start: 2023-10-24 | End: 2023-12-07 | Stop reason: SDUPTHER

## 2023-10-27 LAB
1OH-MIDAZOLAM UR CFM-MCNC: <25 NG/ML
6MAM UR CFM-MCNC: <25 NG/ML
7AMINOCLONAZEPAM UR CFM-MCNC: <25 NG/ML
A-OH ALPRAZ UR CFM-MCNC: <25 NG/ML
ALPRAZ UR CFM-MCNC: <25 NG/ML
CHLORDIAZEP UR CFM-MCNC: <25 NG/ML
CLONAZEPAM UR CFM-MCNC: <25 NG/ML
CODEINE UR CFM-MCNC: <50 NG/ML
DIAZEPAM UR CFM-MCNC: <25 NG/ML
EDDP UR CFM-MCNC: <25 NG/ML
FENTANYL UR CFM-MCNC: <2.5 NG/ML
HYDROCODONE CTO UR CFM-MCNC: <25 NG/ML
HYDROMORPHONE UR CFM-MCNC: <25 NG/ML
LORAZEPAM UR CFM-MCNC: 655 NG/ML
METHADONE UR CFM-MCNC: <25 NG/ML
MIDAZOLAM UR CFM-MCNC: <25 NG/ML
MORPHINE UR CFM-MCNC: <50 NG/ML
NORDIAZEPAM UR CFM-MCNC: <25 NG/ML
NORFENTANYL UR CFM-MCNC: <2.5 NG/ML
NORHYDROCODONE UR CFM-MCNC: <25 NG/ML
NOROXYCODONE UR CFM-MCNC: <25 NG/ML
NORTRAMADOL UR-MCNC: <50 NG/ML
OXAZEPAM UR CFM-MCNC: <25 NG/ML
OXYCODONE UR CFM-MCNC: <25 NG/ML
OXYMORPHONE UR CFM-MCNC: <25 NG/ML
TEMAZEPAM UR CFM-MCNC: <25 NG/ML
TRAMADOL UR CFM-MCNC: <50 NG/ML
ZOLPIDEM UR CFM-MCNC: <25 NG/ML
ZOLPIDEM UR-MCNC: <25 NG/ML

## 2023-12-04 ENCOUNTER — OFFICE VISIT (OUTPATIENT)
Dept: BEHAVIORAL HEALTH | Facility: CLINIC | Age: 23
End: 2023-12-04
Payer: COMMERCIAL

## 2023-12-04 DIAGNOSIS — F84.0 AUTISM SPECTRUM DISORDER (HHS-HCC): ICD-10-CM

## 2023-12-04 DIAGNOSIS — F41.1 GENERALIZED ANXIETY DISORDER: ICD-10-CM

## 2023-12-04 DIAGNOSIS — F42.8 OTHER OBSESSIVE-COMPULSIVE DISORDERS: Primary | ICD-10-CM

## 2023-12-04 DIAGNOSIS — G80.1 SPASTIC DIPLEGIC CEREBRAL PALSY (MULTI): ICD-10-CM

## 2023-12-04 PROCEDURE — 99214 OFFICE O/P EST MOD 30 MIN: CPT | Mod: AM,95 | Performed by: PSYCHIATRY & NEUROLOGY

## 2023-12-04 PROCEDURE — 99214 OFFICE O/P EST MOD 30 MIN: CPT | Performed by: PSYCHIATRY & NEUROLOGY

## 2023-12-04 PROCEDURE — 1036F TOBACCO NON-USER: CPT | Performed by: PSYCHIATRY & NEUROLOGY

## 2023-12-04 RX ORDER — SERTRALINE HYDROCHLORIDE 100 MG/1
50 TABLET, FILM COATED ORAL 3 TIMES DAILY
Qty: 47 TABLET | Refills: 2 | Status: SHIPPED | OUTPATIENT
Start: 2023-12-04 | End: 2024-03-04 | Stop reason: SDUPTHER

## 2023-12-04 RX ORDER — LORAZEPAM 1 MG/1
1 TABLET ORAL NIGHTLY
Qty: 31 TABLET | Refills: 2 | Status: SHIPPED | OUTPATIENT
Start: 2023-12-04 | End: 2024-03-04 | Stop reason: SDUPTHER

## 2023-12-04 RX ORDER — GABAPENTIN 100 MG/1
200 CAPSULE ORAL 2 TIMES DAILY
Qty: 124 CAPSULE | Refills: 2 | Status: SHIPPED | OUTPATIENT
Start: 2023-12-04 | End: 2024-03-04 | Stop reason: SDUPTHER

## 2023-12-04 ASSESSMENT — ENCOUNTER SYMPTOMS
SHORTNESS OF BREATH: 0
AGITATION: 0
TREMORS: 0
SPEECH DIFFICULTY: 0
ABDOMINAL PAIN: 0
SEIZURES: 0
ACTIVITY CHANGE: 0
SLEEP DISTURBANCE: 0

## 2023-12-04 NOTE — PATIENT INSTRUCTIONS
Evert continues to do well with current treatments - no changes recommended at this time.  Next appointment: Monday 3/04/2024 at 10:30 AM jose.

## 2023-12-04 NOTE — PROGRESS NOTES
Outpatient Psychiatry    A HIPAA-compliant interactive audio and video telecommunication system which permits real time communications between the patient (at the originating site) and provider (at the distant site) was utilized to provide this telehealth service.     The patient, family, caregivers and guardian (as appropriate) have provided consent on this date to conduct treatment via this telehealth service.  The patient's identity and physical location were verified at the time of this visit.      Present for appointment: Evert and [facility] staff ([place]).    SUBJECTIVE    Evert has been doing well since we last met.  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  He will be starting 2 days/week at [employer] in 2024 (along with 2 days/week at [employer]).  He has remained on the scheduled/daily dose of lorazepam 1mg at bedtime since our last appointment and the general report is that it continues to be very helpful for managing anxiety.  He seems to be in a great mood, his anxiety is very minimal, and he is sleeping better overall.  It does not seem that these benefits have decreased with the duration of treatment.  Evert says he likes his home, his staff, and his room-mate.    Review of Systems   Constitutional:  Negative for activity change.   Respiratory:  Negative for shortness of breath.    Cardiovascular:  Negative for chest pain.   Gastrointestinal:  Negative for abdominal pain.   Neurological:  Negative for tremors, seizures and speech difficulty.   Psychiatric/Behavioral:  Negative for agitation, behavioral problems and sleep disturbance.       Controlled Substance Evaluation  OARRS/PDMP reviewed: Tenzin Brown MD on 12/4/2023  6:39 AM  Is the patient prescribed a combination of a benzodiazepine and opioid? No  I have personally reviewed the OARRS report for Paco Adams.   I have considered the risks  of abuse, dependence, addiction and diversion.    I believe that it is clinically appropriate for Paco Adams to be prescribed this medication.    Last Urine Drug Screen:  Recent Results (from the past 8760 hour(s))   Opiate/Opioid/Benzo Prescription Compliance Confirmation    Collection Time: 10/18/23  8:58 AM   Result Value Ref Range    Clonazepam <25 <25 ng/mL    7-Aminoclonazepam <25 <25 ng/mL    Alprazolam <25 <25 ng/mL    Alpha-Hydroxyalprazolam <25 <25 ng/mL    Midazolam <25 <25 ng/mL    Alpha-Hydroxymidazolam <25 <25 ng/mL    Chlordiazepoxide <25 <25 ng/mL    Diazepam <25 <25 ng/mL    Nordiazepam <25 <25 ng/mL    Temazepam <25 <25 ng/mL    Oxazepam <25 <25 ng/mL    Lorazepam 655 (H) <25 ng/mL    Methadone <25 <25 ng/mL    EDDP <25 <25 ng/mL    6-Acetylmorphine <25 <25 ng/mL    Codeine <50 <50 ng/mL    Hydrocodone <25 <25 ng/mL    Hydromorphone <25 <25 ng/mL    Morphine  <50 <50 ng/mL    Norhydrocodone <25 <25 ng/mL    Noroxycodone <25 <25 ng/mL    Oxycodone <25 <25 ng/mL    Oxymorphone <25 <25 ng/mL    Fentanyl <2.5 <2.5 ng/mL    Norfentanyl <2.5 <2.5 ng/mL    Tramadol <50 <50 ng/mL    O-Desmethyltramadol <50 <50 ng/mL    Zolpidem <25 <25 ng/mL    Zolpidem Metabolite (ZCA) <25 <25 ng/mL   Opiate/Opioid/Benzo Prescription Compliance Screen    Collection Time: 10/18/23  8:58 AM   Result Value Ref Range    Creatinine, Urine Random 127.2 20.0 - 370.0 mg/dL    Amphetamine Screen, Urine Presumptive Negative Presumptive Negative    Barbiturate Screen, Urine Presumptive Negative Presumptive Negative    Cannabinoid Screen, Urine Presumptive Negative Presumptive Negative    Cocaine Metabolite Screen, Urine Presumptive Negative Presumptive Negative    PCP Screen, Urine Presumptive Negative Presumptive Negative     Results as expected? Yes    Controlled Substance Agreement: 08/08/2023  Reviewed Controlled Substance Agreement, including but not limited to:  Benefits, risks, and alternatives to treatment with a  controlled substance medication(s).    Prescribed Controlled Substances:  Benzodiazepines: Lorazepam  What is the patient's goal of therapy? Sleep and reduced anxiety  Is this being achieved with current treatment? Yes  Activities of Daily Living:   Is your overall impression that this patient is benefiting (symptom reduction outweighs side effects) from benzodiazepine therapy? Yes   1. Physical Functioning: Same  2. Family Relationship: Same  3. Social Relationship: Same  4. Mood: Better  5. Sleep Patterns: Better  6. Overall Function: Better     MEDICATION HISTORY  Buspirone - over-activating, possible serotonin syndrome  Fluvoxamine - had been helpful in the past    CURRENT MEDICATIONS    Current Outpatient Medications:     acetaminophen (Tylenol) 325 mg tablet, , Disp: , Rfl:     baclofen (Lioresal) 20 mg tablet, Take 1 tablet (20 mg) by mouth 3 times a day., Disp: 90 tablet, Rfl: 11    Banophen 25 mg tablet, , Disp: , Rfl:     Blistex Medicated 0.6-0.5-1.1-0.5 % ointment ointment, , Disp: , Rfl:     Calamine Plus, pramox-calamin, 1-8 % lotion, , Disp: , Rfl:     cetirizine (ZyrTEC) 10 mg tablet, TAKE 1 TABLET BY MOUTH WITH BREAKFAST, Disp: 90 tablet, Rfl: 1    Cough Drops 5.8 mg lozenge, , Disp: , Rfl:     docusate sodium (Colace) 100 mg capsule, 2 capsules by mouth daily, Disp: 60 capsule, Rfl: 11    fluticasone (Flovent) 110 mcg/actuation inhaler, Inhale 2 puffs every 12 hours if needed., Disp: , Rfl:     gabapentin (Neurontin) 100 mg capsule, Take 2 capsules (200 mg) by mouth 2 times a day., Disp: 124 capsule, Rfl: 2    LORazepam (Ativan) 1 mg tablet, Take 1 tablet (1 mg) by mouth once daily at bedtime., Disp: 31 tablet, Rfl: 2    Milk of Magnesia 400 mg/5 mL suspension, , Disp: , Rfl:     montelukast (Singulair) 10 mg tablet, Take 1 tablet (10 mg) by mouth once daily at bedtime., Disp: 90 tablet, Rfl: 3    Mucinex DM  mg 12 hr tablet, , Disp: , Rfl:     multivitamin with minerals  (multivitamin-iron-folic acid) tablet, Take by mouth once daily., Disp: , Rfl:     polyethylene glycol (Glycolax) 17 gram/dose powder, One half capful in 8 ounces of fluid daily, Disp: 850 g, Rfl: 11    sertraline (Zoloft) 100 mg tablet, Take 0.5 tablets (50 mg) by mouth 3 times a day., Disp: 47 tablet, Rfl: 2    triamcinolone (Kenalog) 0.1 % cream, Apply topically 2 times a day., Disp: 400 g, Rfl: 1     SOCIAL HISTORY  Living situation [facility]aiver home with 1 male house-mate   Provider agency [facility]   Work or day program [employer] 4 days/week (Tuesday-Friday)   School Graduated [employer]    Guardianship Parents are co-guardians   SSA ?   Bx Specialist ?   Nicotine None   Alcohol None   Other drugs None     OBJECTIVE    Lab Results   Component Value Date    HGB 16.5 10/18/2023     10/18/2023    NEUTROABS 2.23 10/18/2023    GLUCOSE 85 10/18/2023     10/18/2023    K 4.0 10/18/2023    CO2 26 10/18/2023    CALCIUM 9.7 10/18/2023    CREATININE 0.68 10/18/2023    AST 19 10/18/2023    ALT 15 10/18/2023    CHOL 175 10/18/2023    LDLF 80 05/07/2019    TRIG 57 10/18/2023     Therapeutic Drug Monitoring  N/A    Electrocardiograms  None in chart    MENTAL STATUS EXAM  General/Appearance: Appropriate dress/hygiene/grooming.  Attitude/Behavior: Actively engages with interview. Calm/pleasant.  Speech/Communication: Hypertalkative. Interrupts or talks over others.  Motor: Stimming.  Gait: Not assessed at this visit.  Mood: Appears to be in good spirits.  Affect: Expansive. Elevated. Excitable.  Thought processes: Perseverative.  Thought content: Obsessions.  Perception: Does not appear to be experiencing or responding to hallucinations.  Sensorium/Cognition: Alert, awake, oriented to person/place/time. Difficulty shifting between topics.  Memory: Recent & remote recall is intact.  Insight: Minimal.  Judgment: Fair. Behaviorally under control.     ASSESSMENT  Evert continues to do well with regular/scheduled use of bedtime  lorazepam.  He is sleeping better, and his mood and anxiety levels during the daytime have also improved.  We will re-evaluate the treatment efficacy at our next check-in.    PROBLEM LIST  ASD  BIANCA  OCD    PLAN  -- Continue lorazepam 1mg at bedtime for anxiety and OCD  -- Continue sertraline 50mg TID for anxiety and OCD  -- Continue gabapentin 200mg BID for anxiety  -- Follow up 3 months    Tenzin Brown MD    Prep time on date of the patient encounter: 5 minutes   Time spent directly with patient/family/caregiver: 25 minutes   Additional time spent on patient care activities: 0 minutes   Documentation time: 5 minutes   Other time spent: 0 minutes   Total time on date of patient encounter: 35 minutes

## 2023-12-07 DIAGNOSIS — R21 RASH: ICD-10-CM

## 2023-12-07 RX ORDER — TRIAMCINOLONE ACETONIDE 1 MG/G
CREAM TOPICAL 2 TIMES DAILY
Qty: 400 G | Refills: 1 | Status: SHIPPED | OUTPATIENT
Start: 2023-12-07 | End: 2023-12-08 | Stop reason: SDUPTHER

## 2023-12-08 DIAGNOSIS — R21 RASH: ICD-10-CM

## 2023-12-08 RX ORDER — TRIAMCINOLONE ACETONIDE 1 MG/G
CREAM TOPICAL 2 TIMES DAILY
Qty: 400 G | Refills: 1 | Status: SHIPPED | OUTPATIENT
Start: 2023-12-08 | End: 2024-01-11 | Stop reason: SDUPTHER

## 2024-01-11 DIAGNOSIS — R21 RASH: ICD-10-CM

## 2024-01-11 RX ORDER — TRIAMCINOLONE ACETONIDE 1 MG/G
CREAM TOPICAL 2 TIMES DAILY
Qty: 400 G | Refills: 2 | Status: SHIPPED | OUTPATIENT
Start: 2024-01-11 | End: 2024-03-12 | Stop reason: SDUPTHER

## 2024-03-04 ENCOUNTER — TELEMEDICINE (OUTPATIENT)
Dept: BEHAVIORAL HEALTH | Facility: CLINIC | Age: 24
End: 2024-03-04
Payer: COMMERCIAL

## 2024-03-04 DIAGNOSIS — G80.1 SPASTIC DIPLEGIC CEREBRAL PALSY (MULTI): ICD-10-CM

## 2024-03-04 DIAGNOSIS — F42.8 OTHER OBSESSIVE-COMPULSIVE DISORDERS: Primary | ICD-10-CM

## 2024-03-04 DIAGNOSIS — F84.0 AUTISM SPECTRUM DISORDER (HHS-HCC): ICD-10-CM

## 2024-03-04 DIAGNOSIS — F41.1 GENERALIZED ANXIETY DISORDER: ICD-10-CM

## 2024-03-04 PROCEDURE — 99214 OFFICE O/P EST MOD 30 MIN: CPT | Performed by: PSYCHIATRY & NEUROLOGY

## 2024-03-04 PROCEDURE — 1036F TOBACCO NON-USER: CPT | Performed by: PSYCHIATRY & NEUROLOGY

## 2024-03-04 RX ORDER — GABAPENTIN 100 MG/1
200 CAPSULE ORAL 2 TIMES DAILY
Qty: 124 CAPSULE | Refills: 2 | Status: SHIPPED | OUTPATIENT
Start: 2024-03-04 | End: 2024-03-12 | Stop reason: SDUPTHER

## 2024-03-04 RX ORDER — SERTRALINE HYDROCHLORIDE 100 MG/1
50 TABLET, FILM COATED ORAL 3 TIMES DAILY
Qty: 47 TABLET | Refills: 2 | Status: SHIPPED | OUTPATIENT
Start: 2024-03-04 | End: 2024-03-12 | Stop reason: SDUPTHER

## 2024-03-04 RX ORDER — LORAZEPAM 1 MG/1
1 TABLET ORAL NIGHTLY
Qty: 31 TABLET | Refills: 2 | Status: SHIPPED | OUTPATIENT
Start: 2024-03-04 | End: 2024-03-12 | Stop reason: SDUPTHER

## 2024-03-04 SDOH — ECONOMIC STABILITY: HOUSING INSECURITY
IN THE LAST 12 MONTHS, WAS THERE A TIME WHEN YOU DID NOT HAVE A STEADY PLACE TO SLEEP OR SLEPT IN A SHELTER (INCLUDING NOW)?: NO

## 2024-03-04 SDOH — ECONOMIC STABILITY: TRANSPORTATION INSECURITY
IN THE PAST 12 MONTHS, HAS LACK OF TRANSPORTATION KEPT YOU FROM MEETINGS, WORK, OR FROM GETTING THINGS NEEDED FOR DAILY LIVING?: NO

## 2024-03-04 SDOH — SOCIAL STABILITY: SOCIAL INSECURITY
WITHIN THE LAST YEAR, HAVE TO BEEN RAPED OR FORCED TO HAVE ANY KIND OF SEXUAL ACTIVITY BY YOUR PARTNER OR EX-PARTNER?: NO

## 2024-03-04 SDOH — HEALTH STABILITY: MENTAL HEALTH: HOW OFTEN DO YOU HAVE 6 OR MORE DRINKS ON ONE OCCASION?: NEVER

## 2024-03-04 SDOH — ECONOMIC STABILITY: FOOD INSECURITY: WITHIN THE PAST 12 MONTHS, THE FOOD YOU BOUGHT JUST DIDN'T LAST AND YOU DIDN'T HAVE MONEY TO GET MORE.: NEVER TRUE

## 2024-03-04 SDOH — ECONOMIC STABILITY: FOOD INSECURITY: WITHIN THE PAST 12 MONTHS, YOU WORRIED THAT YOUR FOOD WOULD RUN OUT BEFORE YOU GOT MONEY TO BUY MORE.: NEVER TRUE

## 2024-03-04 SDOH — ECONOMIC STABILITY: INCOME INSECURITY: HOW HARD IS IT FOR YOU TO PAY FOR THE VERY BASICS LIKE FOOD, HOUSING, MEDICAL CARE, AND HEATING?: NOT VERY HARD

## 2024-03-04 SDOH — HEALTH STABILITY: MENTAL HEALTH: HOW OFTEN DO YOU HAVE A DRINK CONTAINING ALCOHOL?: NEVER

## 2024-03-04 SDOH — SOCIAL STABILITY: SOCIAL INSECURITY: WITHIN THE LAST YEAR, HAVE YOU BEEN HUMILIATED OR EMOTIONALLY ABUSED IN OTHER WAYS BY YOUR PARTNER OR EX-PARTNER?: NO

## 2024-03-04 SDOH — HEALTH STABILITY: MENTAL HEALTH: HOW MANY STANDARD DRINKS CONTAINING ALCOHOL DO YOU HAVE ON A TYPICAL DAY?: PATIENT DOES NOT DRINK

## 2024-03-04 SDOH — SOCIAL STABILITY: SOCIAL INSECURITY
WITHIN THE LAST YEAR, HAVE YOU BEEN KICKED, HIT, SLAPPED, OR OTHERWISE PHYSICALLY HURT BY YOUR PARTNER OR EX-PARTNER?: NO

## 2024-03-04 SDOH — ECONOMIC STABILITY: INCOME INSECURITY: IN THE LAST 12 MONTHS, WAS THERE A TIME WHEN YOU WERE NOT ABLE TO PAY THE MORTGAGE OR RENT ON TIME?: NO

## 2024-03-04 SDOH — ECONOMIC STABILITY: HOUSING INSECURITY: IN THE LAST 12 MONTHS, HOW MANY PLACES HAVE YOU LIVED?: 1

## 2024-03-04 SDOH — ECONOMIC STABILITY: INCOME INSECURITY: IN THE PAST 12 MONTHS, HAS THE ELECTRIC, GAS, OIL, OR WATER COMPANY THREATENED TO SHUT OFF SERVICE IN YOUR HOME?: NO

## 2024-03-04 SDOH — SOCIAL STABILITY: SOCIAL INSECURITY: WITHIN THE LAST YEAR, HAVE YOU BEEN AFRAID OF YOUR PARTNER OR EX-PARTNER?: NO

## 2024-03-04 SDOH — ECONOMIC STABILITY: TRANSPORTATION INSECURITY
IN THE PAST 12 MONTHS, HAS THE LACK OF TRANSPORTATION KEPT YOU FROM MEDICAL APPOINTMENTS OR FROM GETTING MEDICATIONS?: NO

## 2024-03-04 ASSESSMENT — LIFESTYLE VARIABLES
AUDIT-C TOTAL SCORE: 0
SKIP TO QUESTIONS 9-10: 1

## 2024-03-04 ASSESSMENT — ENCOUNTER SYMPTOMS
ACTIVITY CHANGE: 0
ABDOMINAL PAIN: 0
AGITATION: 0
SLEEP DISTURBANCE: 0
SPEECH DIFFICULTY: 0
SEIZURES: 0
TREMORS: 0
SHORTNESS OF BREATH: 0

## 2024-03-04 NOTE — PROGRESS NOTES
Outpatient Psychiatry    A HIPAA-compliant interactive audio and video telecommunication system which permits real time communications between the patient (at the originating site) and provider (at the distant site) was utilized to provide this telehealth service.     The patient, family, caregivers and guardian (as appropriate) have provided consent on this date to conduct treatment via this telehealth service.  The patient's identity and physical location were verified at the time of this visit.      Present for appointment: Evert.    SUBJECTIVE    Doing well overall since last visit.  No significant updates or reports of mood/anxiety problems from Evert's family or [facility] staff.  He has not yet started attending PI (still going to [employer] 4 days per week).  He's looking forward to a visit to [employer] in [place] in June.  He is supposedly going to be taking a trip to [place] in April to meet up with one of his friends/idols in the [employer] community.  He has remained on the scheduled/daily dose of lorazepam 1mg at bedtime since our last appointment and the general report is that it continues to be very helpful for managing anxiety.  He seems to be in a great mood, his anxiety is very minimal, and he is sleeping better overall.      Review of Systems   Constitutional:  Negative for activity change.   Respiratory:  Negative for shortness of breath.    Cardiovascular:  Negative for chest pain.   Gastrointestinal:  Negative for abdominal pain.   Neurological:  Negative for tremors, seizures and speech difficulty.   Psychiatric/Behavioral:  Negative for agitation, behavioral problems and sleep disturbance.       Controlled Substance Evaluation  OARRS/PDMP reviewed: Tenzin Brown MD on 3/4/2024  6:56 AM  Is the patient prescribed a combination of a benzodiazepine and opioid? No  I have personally reviewed the OARRS report for Paco Adams.   I have considered the risks of  abuse, dependence, addiction and diversion.    I believe that it is clinically appropriate for Paco Adams to be prescribed this medication.    Last Urine Drug Screen:  Recent Results (from the past 8760 hour(s))   Opiate/Opioid/Benzo Prescription Compliance Confirmation    Collection Time: 10/18/23  8:58 AM   Result Value Ref Range    Clonazepam <25 <25 ng/mL    7-Aminoclonazepam <25 <25 ng/mL    Alprazolam <25 <25 ng/mL    Alpha-Hydroxyalprazolam <25 <25 ng/mL    Midazolam <25 <25 ng/mL    Alpha-Hydroxymidazolam <25 <25 ng/mL    Chlordiazepoxide <25 <25 ng/mL    Diazepam <25 <25 ng/mL    Nordiazepam <25 <25 ng/mL    Temazepam <25 <25 ng/mL    Oxazepam <25 <25 ng/mL    Lorazepam 655 (H) <25 ng/mL    Methadone <25 <25 ng/mL    EDDP <25 <25 ng/mL    6-Acetylmorphine <25 <25 ng/mL    Codeine <50 <50 ng/mL    Hydrocodone <25 <25 ng/mL    Hydromorphone <25 <25 ng/mL    Morphine  <50 <50 ng/mL    Norhydrocodone <25 <25 ng/mL    Noroxycodone <25 <25 ng/mL    Oxycodone <25 <25 ng/mL    Oxymorphone <25 <25 ng/mL    Fentanyl <2.5 <2.5 ng/mL    Norfentanyl <2.5 <2.5 ng/mL    Tramadol <50 <50 ng/mL    O-Desmethyltramadol <50 <50 ng/mL    Zolpidem <25 <25 ng/mL    Zolpidem Metabolite (ZCA) <25 <25 ng/mL   Opiate/Opioid/Benzo Prescription Compliance Screen    Collection Time: 10/18/23  8:58 AM   Result Value Ref Range    Creatinine, Urine Random 127.2 20.0 - 370.0 mg/dL    Amphetamine Screen, Urine Presumptive Negative Presumptive Negative    Barbiturate Screen, Urine Presumptive Negative Presumptive Negative    Cannabinoid Screen, Urine Presumptive Negative Presumptive Negative    Cocaine Metabolite Screen, Urine Presumptive Negative Presumptive Negative    PCP Screen, Urine Presumptive Negative Presumptive Negative     Results as expected? Yes    Controlled Substance Agreement: 08/08/2023  Reviewed Controlled Substance Agreement, including but not limited to:  Benefits, risks, and alternatives to treatment with a  controlled substance medication(s).    Prescribed Controlled Substances:  Benzodiazepines: Lorazepam  What is the patient's goal of therapy? Sleep and reduced anxiety  Is this being achieved with current treatment? Yes  Activities of Daily Living:   Is your overall impression that this patient is benefiting (symptom reduction outweighs side effects) from benzodiazepine therapy? Yes   1. Physical Functioning: Same  2. Family Relationship: Same  3. Social Relationship: Same  4. Mood: Better  5. Sleep Patterns: Better  6. Overall Function: Better     MEDICATION HISTORY  Buspirone - over-activating, possible serotonin syndrome  Fluvoxamine - had been helpful in the past    CURRENT MEDICATIONS    Current Outpatient Medications:     gabapentin (Neurontin) 100 mg capsule, Take 2 capsules (200 mg) by mouth 2 times a day., Disp: 124 capsule, Rfl: 2    LORazepam (Ativan) 1 mg tablet, Take 1 tablet (1 mg) by mouth once daily at bedtime., Disp: 31 tablet, Rfl: 2    sertraline (Zoloft) 100 mg tablet, Take 0.5 tablets (50 mg) by mouth 3 times a day., Disp: 47 tablet, Rfl: 2    acetaminophen (Tylenol) 325 mg tablet, , Disp: , Rfl:     baclofen (Lioresal) 20 mg tablet, Take 1 tablet (20 mg) by mouth 3 times a day., Disp: 90 tablet, Rfl: 11    Blistex Medicated 0.6-0.5-1.1-0.5 % ointment ointment, , Disp: , Rfl:     Calamine Plus, pramox-calamin, 1-8 % lotion, , Disp: , Rfl:     cetirizine (ZyrTEC) 10 mg tablet, TAKE 1 TABLET BY MOUTH WITH BREAKFAST, Disp: 90 tablet, Rfl: 1    Cough Drops 5.8 mg lozenge, , Disp: , Rfl:     docusate sodium (Colace) 100 mg capsule, 2 capsules by mouth daily, Disp: 60 capsule, Rfl: 11    fluticasone (Flovent) 110 mcg/actuation inhaler, Inhale 2 puffs every 12 hours if needed., Disp: , Rfl:     Milk of Magnesia 400 mg/5 mL suspension, , Disp: , Rfl:     montelukast (Singulair) 10 mg tablet, Take 1 tablet (10 mg) by mouth once daily at bedtime., Disp: 90 tablet, Rfl: 3    Mucinex DM  mg 12 hr  tablet, , Disp: , Rfl:     multivitamin with minerals (multivitamin-iron-folic acid) tablet, Take by mouth once daily., Disp: , Rfl:     polyethylene glycol (Glycolax) 17 gram/dose powder, One half capful in 8 ounces of fluid daily, Disp: 850 g, Rfl: 11    triamcinolone (Kenalog) 0.1 % cream, Apply topically 2 times a day., Disp: 400 g, Rfl: 2     SOCIAL HISTORY  Living situation Waiver home with 1 male house-mate   Provider agency [facility]   Work or day program [employer] 4 days/week (Tuesday-Friday)   School Graduated [employer]    Guardianship Parents are co-guardians   SSA ?   Bx Specialist ?   Nicotine None   Alcohol None   Other drugs None     OBJECTIVE    Lab Results   Component Value Date    HGB 16.5 10/18/2023     10/18/2023    NEUTROABS 2.23 10/18/2023    GLUCOSE 85 10/18/2023     10/18/2023    K 4.0 10/18/2023    CO2 26 10/18/2023    CALCIUM 9.7 10/18/2023    CREATININE 0.68 10/18/2023    AST 19 10/18/2023    ALT 15 10/18/2023    CHOL 175 10/18/2023    LDLF 80 05/07/2019    TRIG 57 10/18/2023     Therapeutic Drug Monitoring  N/A    Electrocardiograms  None in chart    MENTAL STATUS EXAM  General/Appearance: Appropriate dress/hygiene/grooming.  Attitude/Behavior: Actively engages with interview. Calm/pleasant.  Speech/Communication: Hypertalkative. Interrupts or talks over others.  Motor: Stimming.  Gait: Not assessed at this visit.  Mood: Appears to be in good spirits.  Affect: Excited at times.  Thought processes: Perseverative.  Thought content: Obsessions. Repetitively asks about places I've visited.  Perception: Does not appear to be experiencing or responding to hallucinations.  Sensorium/Cognition: Alert, awake, oriented to person/place/time. Difficulty shifting between topics.  Memory: Recent & remote recall is intact.  Insight: Minimal.  Judgment: Fair. Behaviorally under control.     ASSESSMENT  Evert continues to do well -- no indication to make treatment changes at this  time.    PROBLEM LIST  ASD  BIANCA  OCD    PLAN  -- Continue lorazepam 1mg at bedtime for anxiety and OCD  -- Continue sertraline 50mg TID for anxiety and OCD  -- Continue gabapentin 200mg BID for anxiety  -- Follow up 3 months    Tenzin Brown MD    Prep time on date of the patient encounter: 5 minutes   Time spent directly with patient/family/caregiver: 25 minutes   Additional time spent on patient care activities: 0 minutes   Documentation time: 5 minutes   Other time spent: 0 minutes   Total time on date of patient encounter: 35 minutes

## 2024-03-04 NOTE — PATIENT INSTRUCTIONS
Continue current medications at this time.    We will plan for follow-up in 3 months (June 2024).    Please note that due to prescribing of controlled medications, Paco Adams will need ONE face-to-face in-person office visit in 2024 to meet  compliance requirements.     Call [phone] to schedule next appointment at [facility] (Wednesday or Thursday).

## 2024-03-11 ENCOUNTER — TELEPHONE (OUTPATIENT)
Dept: PHYSICAL MEDICINE AND REHAB | Facility: CLINIC | Age: 24
End: 2024-03-11
Payer: COMMERCIAL

## 2024-03-11 DIAGNOSIS — R21 RASH: ICD-10-CM

## 2024-03-11 NOTE — TELEPHONE ENCOUNTER
----- Message from Ami Alan MD sent at 3/11/2024  1:48 PM EDT -----  Regarding: FW: Paco Angie (: 2000) - new pharmacy  Contact: [phone]  Please change the pharmacy and let her know.  Thank you  ----- Message -----  From: Paco Adams  Sent: 3/11/2024   1:46 PM EDT  To: [id number] Clinical Support Staff  Subject: Paco Adams (: 2000) - new ph#    Hi, Dr. Alan!  The pharmacy Evert currently uses will be closing soon.  I have put the information for his new pharmacy - [facility] in [place], [place] - in his chart but wasn't sure how to make it his new default.  I just wanted to make you aware of the change for refills and future prescriptions.     Thanks so much for taking such good care of him!  Kavya

## 2024-03-12 DIAGNOSIS — J30.9 ALLERGIC RHINITIS, UNSPECIFIED SEASONALITY, UNSPECIFIED TRIGGER: ICD-10-CM

## 2024-03-12 DIAGNOSIS — F42.8 OTHER OBSESSIVE-COMPULSIVE DISORDERS: ICD-10-CM

## 2024-03-12 DIAGNOSIS — F41.1 GENERALIZED ANXIETY DISORDER: ICD-10-CM

## 2024-03-12 DIAGNOSIS — J45.909 ASTHMA, UNSPECIFIED ASTHMA SEVERITY, UNSPECIFIED WHETHER COMPLICATED, UNSPECIFIED WHETHER PERSISTENT (HHS-HCC): ICD-10-CM

## 2024-03-12 DIAGNOSIS — G80.1 SPASTIC DIPLEGIC CEREBRAL PALSY (MULTI): ICD-10-CM

## 2024-03-12 DIAGNOSIS — K59.00 CONSTIPATION, UNSPECIFIED CONSTIPATION TYPE: ICD-10-CM

## 2024-03-12 DIAGNOSIS — J30.89 SEASONAL ALLERGIC RHINITIS DUE TO OTHER ALLERGIC TRIGGER: ICD-10-CM

## 2024-03-12 DIAGNOSIS — R21 RASH: ICD-10-CM

## 2024-03-12 RX ORDER — SERTRALINE HYDROCHLORIDE 100 MG/1
50 TABLET, FILM COATED ORAL 3 TIMES DAILY
Qty: 47 TABLET | Refills: 2 | Status: SHIPPED | OUTPATIENT
Start: 2024-03-12 | End: 2024-05-21 | Stop reason: SDUPTHER

## 2024-03-12 RX ORDER — FLUTICASONE PROPIONATE 110 UG/1
2 AEROSOL, METERED RESPIRATORY (INHALATION)
Qty: 12 G | Refills: 1 | Status: SHIPPED | OUTPATIENT
Start: 2024-03-12 | End: 2024-04-22

## 2024-03-12 RX ORDER — GUAIFENESIN AND DEXTROMETHORPHAN HYDROBROMIDE 600; 30 MG/1; MG/1
1 TABLET, EXTENDED RELEASE ORAL EVERY 12 HOURS PRN
Qty: 90 TABLET | Refills: 3 | Status: SHIPPED | OUTPATIENT
Start: 2024-03-12 | End: 2024-05-15 | Stop reason: SDUPTHER

## 2024-03-12 RX ORDER — BACLOFEN 20 MG/1
20 TABLET ORAL 3 TIMES DAILY
Qty: 90 TABLET | Refills: 11 | Status: SHIPPED | OUTPATIENT
Start: 2024-03-12 | End: 2024-05-15 | Stop reason: SDUPTHER

## 2024-03-12 RX ORDER — DIMETHICONE, CAMPHOR (SYNTHETIC), MENTHOL, AND PHENOL 1.1; .5; .625; .5 G/100G; G/100G; G/100G; G/100G
OINTMENT TOPICAL
Qty: 6 G | Refills: 11 | Status: SHIPPED | OUTPATIENT
Start: 2024-03-12 | End: 2024-05-15 | Stop reason: SDUPTHER

## 2024-03-12 RX ORDER — TOOTHBRUSH
EACH DENTAL
Qty: 80 LOZENGE | Refills: 3 | Status: SHIPPED | OUTPATIENT
Start: 2024-03-12 | End: 2024-05-15 | Stop reason: SDUPTHER

## 2024-03-12 RX ORDER — BENZOCAINE 20 %
PASTE (GRAM) MUCOUS MEMBRANE
Qty: 177 ML | Refills: 3 | Status: SHIPPED | OUTPATIENT
Start: 2024-03-12 | End: 2024-05-15 | Stop reason: SDUPTHER

## 2024-03-12 RX ORDER — ACETAMINOPHEN 325 MG/1
TABLET ORAL
Qty: 90 TABLET | Refills: 3 | Status: SHIPPED | OUTPATIENT
Start: 2024-03-12 | End: 2024-05-15 | Stop reason: SDUPTHER

## 2024-03-12 RX ORDER — ASCORBIC ACID 1000 MG
5 TABLET ORAL DAILY PRN
Qty: 360 ML | Refills: 11 | Status: SHIPPED | OUTPATIENT
Start: 2024-03-12 | End: 2024-05-15 | Stop reason: SDUPTHER

## 2024-03-12 RX ORDER — CETIRIZINE HYDROCHLORIDE 10 MG/1
10 TABLET ORAL
Qty: 90 TABLET | Refills: 1 | Status: SHIPPED | OUTPATIENT
Start: 2024-03-12 | End: 2024-05-15 | Stop reason: SDUPTHER

## 2024-03-12 RX ORDER — DOCUSATE SODIUM 100 MG/1
CAPSULE, LIQUID FILLED ORAL
Qty: 60 CAPSULE | Refills: 11 | Status: SHIPPED | OUTPATIENT
Start: 2024-03-12 | End: 2024-05-15 | Stop reason: SDUPTHER

## 2024-03-12 RX ORDER — LORAZEPAM 1 MG/1
1 TABLET ORAL NIGHTLY
Qty: 30 TABLET | Refills: 2 | Status: SHIPPED | OUTPATIENT
Start: 2024-03-12 | End: 2024-03-12 | Stop reason: SDUPTHER

## 2024-03-12 RX ORDER — GABAPENTIN 100 MG/1
200 CAPSULE ORAL 2 TIMES DAILY
Qty: 120 CAPSULE | Refills: 2 | Status: SHIPPED | OUTPATIENT
Start: 2024-03-12 | End: 2024-05-15 | Stop reason: SDUPTHER

## 2024-03-12 RX ORDER — GABAPENTIN 100 MG/1
200 CAPSULE ORAL 2 TIMES DAILY
Qty: 120 CAPSULE | Refills: 2 | Status: SHIPPED | OUTPATIENT
Start: 2024-03-12 | End: 2024-03-12 | Stop reason: SDUPTHER

## 2024-03-12 RX ORDER — POLYETHYLENE GLYCOL 3350 17 G/17G
POWDER, FOR SOLUTION ORAL
Qty: 850 G | Refills: 11 | Status: SHIPPED | OUTPATIENT
Start: 2024-03-12 | End: 2024-05-15 | Stop reason: SDUPTHER

## 2024-03-12 RX ORDER — LORAZEPAM 1 MG/1
1 TABLET ORAL NIGHTLY
Qty: 30 TABLET | Refills: 2 | Status: SHIPPED | OUTPATIENT
Start: 2024-03-12 | End: 2024-05-21

## 2024-03-12 RX ORDER — MONTELUKAST SODIUM 10 MG/1
10 TABLET ORAL NIGHTLY
Qty: 90 TABLET | Refills: 3 | Status: SHIPPED | OUTPATIENT
Start: 2024-03-12 | End: 2024-05-15 | Stop reason: SDUPTHER

## 2024-03-12 RX ORDER — TRIAMCINOLONE ACETONIDE 1 MG/G
CREAM TOPICAL 2 TIMES DAILY
Qty: 400 G | Refills: 2 | Status: SHIPPED | OUTPATIENT
Start: 2024-03-12 | End: 2024-05-15 | Stop reason: SDUPTHER

## 2024-03-19 ENCOUNTER — PATIENT MESSAGE (OUTPATIENT)
Dept: PRIMARY CARE | Facility: CLINIC | Age: 24
End: 2024-03-19
Payer: COMMERCIAL

## 2024-03-19 DIAGNOSIS — J45.40 MODERATE PERSISTENT ASTHMA, UNSPECIFIED WHETHER COMPLICATED (HHS-HCC): ICD-10-CM

## 2024-03-19 DIAGNOSIS — J30.9 ALLERGIC RHINITIS, UNSPECIFIED SEASONALITY, UNSPECIFIED TRIGGER: Primary | ICD-10-CM

## 2024-03-19 RX ORDER — ALBUTEROL SULFATE 90 UG/1
2 AEROSOL, METERED RESPIRATORY (INHALATION) EVERY 4 HOURS PRN
Qty: 8 G | Refills: 11 | Status: SHIPPED | OUTPATIENT
Start: 2024-03-19 | End: 2024-05-15 | Stop reason: SDUPTHER

## 2024-03-19 RX ORDER — TRIAMCINOLONE ACETONIDE 55 UG/1
2 SPRAY, METERED NASAL DAILY
Qty: 16.5 G | Refills: 11 | Status: SHIPPED | OUTPATIENT
Start: 2024-03-19 | End: 2024-05-15 | Stop reason: SDUPTHER

## 2024-04-19 DIAGNOSIS — G80.1 SPASTIC DIPLEGIC CEREBRAL PALSY (MULTI): ICD-10-CM

## 2024-04-22 RX ORDER — FLUTICASONE PROPIONATE 110 UG/1
2 AEROSOL, METERED RESPIRATORY (INHALATION) 2 TIMES DAILY
Qty: 12 G | Refills: 10 | Status: SHIPPED | OUTPATIENT
Start: 2024-04-22 | End: 2024-05-15 | Stop reason: WASHOUT

## 2024-05-15 ENCOUNTER — OFFICE VISIT (OUTPATIENT)
Dept: PRIMARY CARE | Facility: CLINIC | Age: 24
End: 2024-05-15
Payer: COMMERCIAL

## 2024-05-15 VITALS
HEART RATE: 97 BPM | BODY MASS INDEX: 21.83 KG/M2 | TEMPERATURE: 98.9 F | WEIGHT: 131 LBS | HEIGHT: 65 IN | SYSTOLIC BLOOD PRESSURE: 122 MMHG | DIASTOLIC BLOOD PRESSURE: 76 MMHG

## 2024-05-15 DIAGNOSIS — G80.1 SPASTIC DIPLEGIC CEREBRAL PALSY (MULTI): ICD-10-CM

## 2024-05-15 DIAGNOSIS — J45.909 ASTHMA, UNSPECIFIED ASTHMA SEVERITY, UNSPECIFIED WHETHER COMPLICATED, UNSPECIFIED WHETHER PERSISTENT (HHS-HCC): ICD-10-CM

## 2024-05-15 DIAGNOSIS — R21 RASH: ICD-10-CM

## 2024-05-15 DIAGNOSIS — J30.89 SEASONAL ALLERGIC RHINITIS DUE TO OTHER ALLERGIC TRIGGER: ICD-10-CM

## 2024-05-15 DIAGNOSIS — K59.00 CONSTIPATION, UNSPECIFIED CONSTIPATION TYPE: ICD-10-CM

## 2024-05-15 DIAGNOSIS — F42.8 OTHER OBSESSIVE-COMPULSIVE DISORDERS: ICD-10-CM

## 2024-05-15 DIAGNOSIS — J45.40 MODERATE PERSISTENT ASTHMA, UNSPECIFIED WHETHER COMPLICATED (HHS-HCC): ICD-10-CM

## 2024-05-15 DIAGNOSIS — Z00.00 HEALTHCARE MAINTENANCE: Primary | ICD-10-CM

## 2024-05-15 DIAGNOSIS — J30.9 ALLERGIC RHINITIS, UNSPECIFIED SEASONALITY, UNSPECIFIED TRIGGER: ICD-10-CM

## 2024-05-15 DIAGNOSIS — F41.1 GENERALIZED ANXIETY DISORDER: ICD-10-CM

## 2024-05-15 PROCEDURE — 99395 PREV VISIT EST AGE 18-39: CPT | Performed by: STUDENT IN AN ORGANIZED HEALTH CARE EDUCATION/TRAINING PROGRAM

## 2024-05-15 PROCEDURE — 1036F TOBACCO NON-USER: CPT | Performed by: STUDENT IN AN ORGANIZED HEALTH CARE EDUCATION/TRAINING PROGRAM

## 2024-05-15 RX ORDER — GABAPENTIN 100 MG/1
200 CAPSULE ORAL 2 TIMES DAILY
Qty: 120 CAPSULE | Refills: 2 | Status: SHIPPED | OUTPATIENT
Start: 2024-05-15

## 2024-05-15 RX ORDER — ALBUTEROL SULFATE 90 UG/1
2 AEROSOL, METERED RESPIRATORY (INHALATION) EVERY 4 HOURS PRN
Qty: 8 G | Refills: 11 | Status: SHIPPED | OUTPATIENT
Start: 2024-05-15 | End: 2025-05-15

## 2024-05-15 RX ORDER — ASCORBIC ACID 1000 MG
5 TABLET ORAL DAILY PRN
Qty: 360 ML | Refills: 11 | Status: SHIPPED | OUTPATIENT
Start: 2024-05-15

## 2024-05-15 RX ORDER — ACETAMINOPHEN 325 MG/1
TABLET ORAL
Qty: 90 TABLET | Refills: 3 | Status: SHIPPED | OUTPATIENT
Start: 2024-05-15

## 2024-05-15 RX ORDER — DOCUSATE SODIUM 100 MG/1
CAPSULE, LIQUID FILLED ORAL
Qty: 60 CAPSULE | Refills: 11 | Status: SHIPPED | OUTPATIENT
Start: 2024-05-15

## 2024-05-15 RX ORDER — GUAIFENESIN AND DEXTROMETHORPHAN HYDROBROMIDE 600; 30 MG/1; MG/1
1 TABLET, EXTENDED RELEASE ORAL EVERY 12 HOURS PRN
Qty: 90 TABLET | Refills: 3 | Status: SHIPPED | OUTPATIENT
Start: 2024-05-15

## 2024-05-15 RX ORDER — DIMETHICONE, CAMPHOR (SYNTHETIC), MENTHOL, AND PHENOL 1.1; .5; .625; .5 G/100G; G/100G; G/100G; G/100G
OINTMENT TOPICAL
Qty: 6 G | Refills: 11 | Status: SHIPPED | OUTPATIENT
Start: 2024-05-15

## 2024-05-15 RX ORDER — BACLOFEN 20 MG/1
20 TABLET ORAL 3 TIMES DAILY
Qty: 90 TABLET | Refills: 11 | Status: SHIPPED | OUTPATIENT
Start: 2024-05-15

## 2024-05-15 RX ORDER — BISMUTH SUBSALICYLATE 262 MG
1 TABLET,CHEWABLE ORAL DAILY
Qty: 30 TABLET | Refills: 11 | Status: SHIPPED | OUTPATIENT
Start: 2024-05-15 | End: 2025-05-15

## 2024-05-15 RX ORDER — CETIRIZINE HYDROCHLORIDE 10 MG/1
10 TABLET ORAL
Qty: 90 TABLET | Refills: 1 | Status: SHIPPED | OUTPATIENT
Start: 2024-05-15

## 2024-05-15 RX ORDER — POLYETHYLENE GLYCOL 3350 17 G/17G
POWDER, FOR SOLUTION ORAL
Qty: 850 G | Refills: 11 | Status: SHIPPED | OUTPATIENT
Start: 2024-05-15

## 2024-05-15 RX ORDER — TOOTHBRUSH
EACH DENTAL
Qty: 80 LOZENGE | Refills: 3 | Status: SHIPPED | OUTPATIENT
Start: 2024-05-15

## 2024-05-15 RX ORDER — TRIAMCINOLONE ACETONIDE 1 MG/G
CREAM TOPICAL 2 TIMES DAILY
Qty: 400 G | Refills: 2 | Status: SHIPPED | OUTPATIENT
Start: 2024-05-15

## 2024-05-15 RX ORDER — BENZOCAINE 20 %
PASTE (GRAM) MUCOUS MEMBRANE
Qty: 177 ML | Refills: 3 | Status: SHIPPED | OUTPATIENT
Start: 2024-05-15

## 2024-05-15 RX ORDER — TRIAMCINOLONE ACETONIDE 55 UG/1
2 SPRAY, METERED NASAL DAILY
Qty: 16.5 G | Refills: 11 | Status: SHIPPED | OUTPATIENT
Start: 2024-05-15 | End: 2025-05-15

## 2024-05-15 RX ORDER — MONTELUKAST SODIUM 10 MG/1
10 TABLET ORAL NIGHTLY
Qty: 90 TABLET | Refills: 3 | Status: SHIPPED | OUTPATIENT
Start: 2024-05-15 | End: 2025-05-15

## 2024-05-15 NOTE — PROGRESS NOTES
"Subjective   Patient ID: Paco Adams is a 24 y.o. male who presents for No chief complaint on file..    HPI   Here today with Pari, part of the staff at the group home.    Re: CP - see PM+R notes. Received new AFOs since last in. Used to have baclofen pump; this was complicated by infection requiring surgery, now just on oral baclofen and gets therapy regularly. Has myoclonus on exam and needs walking crutches and AFOs to ambulate.     Re: Psych - h/o autism. On several medications, follows with Dr. Curtis. Stable on current medications. Lives in group home, has essentially 24hr care.    Re: other - seen at [facility] podiatry and ophthalmology.      PMHx, FHx, Social Hx, Surg Hx personally reviewed at this appointment. No pertinent findings and/or changes from prior (if applicable)..     ROS: Denies wt gain/loss f/c HA LoC CP SOB NVDC. See HPI above, and scanned sheet (if applicable). All other systems are reviewed and are without complaint.       Review of Systems    Objective   /76   Pulse 97   Temp 37.2 °C (98.9 °F)   Ht 1.651 m (5' 5\")   Wt 59.4 kg (131 lb)   BMI 21.80 kg/m²     Physical Exam  Gen: well developed in NAD. AAO x3.   HEENT: NC/AT. Anicteric sclera, symmetric pupils. MMM no thrush.  Neck: Soft, supple. No LAD. No goiter.   CV: RRR nl s1s2 no m/r/g  Pulm: CTAB no w/r/r, good air exchange  GI: soft NTND BS+ no hsm. Well healed scars from prior surgeries.   Ext: WWP no edema  Neuro: brisk bilateral reflexes UE and LE. Myoclonus on ankle jerk bilaterally   MSK: Spastic muscles throughout, worse LE.   Gait:  unsteady gait with crutches and AFOs  Psych: fair insight. Tangential discussions however.  Skin: two areas (back of head, R foot dorsal aspect) with mild dermatitis.     Lab Results   Component Value Date    WBC 3.6 (L) 10/18/2023    HGB 16.5 10/18/2023    HCT 49.6 10/18/2023     10/18/2023    CHOL 175 10/18/2023    TRIG 57 10/18/2023    HDL 50.7 10/18/2023    ALT 15 " 10/18/2023    AST 19 10/18/2023     10/18/2023    K 4.0 10/18/2023     10/18/2023    CREATININE 0.68 10/18/2023    BUN 12 10/18/2023    CO2 26 10/18/2023     par             Assessment/Plan   Longstanding chronic issues stemming from CP, autism, and OCD/anxiety. Well managed on current meds. Dermatitis improving, doing well on steroid cream. Meds renewed. Lab work is current. Paperwork for group home updated.      # Dermatitis  - steroid cream continued  - referral to dermatology if does not improve with this     # Cerebral Palsy with spasticity  - continue current meds  - use AFOs to walk  - follow up PM&R     # Depression and/or Anxiety  - continue current meds  - follow up psych    # Constipation  - continue current meds     # Health Maintenance  - routine blood work  - Colon Cancer Screening: not indicated   - PSA:  not indicated   - Immunizations:  flu shot  - AAA screening:  not indicated           Patient was identified as a fall risk. Risk prevention instructions provided.

## 2024-05-15 NOTE — PATIENT INSTRUCTIONS
Please stop at the lab ([address]) to complete your blood and/or urine work that I've ordered for you.    I will contact you with the results at my soonest convenience. I strongly urge you to use 123ContactForm as this is the quickest and easiest way to access your results and receive my correspondences.     I have changed your medications today for your convenience. See the medication section of this packet for full details.    I have filled out your required forms and ordered the appropriate tests/blood work, if applicable.         Ways to Help Prevent Falls at Home    Quick Tips   ? Ask for help if you need it. Most people want to help!   ? Get up slowly after sitting or laying down   ? Wear a medical alert device or keep cell phone in your pocket   ? Use night lights, especially areas near a bathroom   ? Keep the items you use often within reach on a small stool or end table   ? Use an assistive device such as walker or cane, as directed by provider/physical therapy   ? Use a non-slip mat and grab bars in your bathroom. Look for home health sections for best options     Other Areas to Focus On   ? Exercise and nutrition: Regular exercise or taking a falls prevention class are great ways improve strength and balance. Don’t forget to stay hydrated and bring a snack!   ? Medicine side effects: Some medicines can make you sleepy or dizzy, which could cause a fall. Ask your healthcare provider about the side effects your medicines could cause. Be sure to let them know if you take any vitamins or supplements as well.   ? Tripping hazards: Remove items you could trip on, such as loose mats, rugs, cords, and clutter. Wear closed toe shoes with rubber soles.   ? Health and wellness: Get regular checkups with your healthcare provider, plus routine vision and hearing screenings. Talk with your healthcare provider about:   o Your medicines and the possible side effects - bring them in a bag if that is easier!   o Problems with  balance or feeling dizzy   o Ways to promote bone health, such as Vitamin D and calcium supplements   o Questions or concerns about falling     *Ask your healthcare team if you have questions     ©[facility], 2022

## 2024-05-21 DIAGNOSIS — F42.8 OTHER OBSESSIVE-COMPULSIVE DISORDERS: ICD-10-CM

## 2024-05-21 DIAGNOSIS — F41.1 GENERALIZED ANXIETY DISORDER: ICD-10-CM

## 2024-05-21 RX ORDER — LORAZEPAM 1 MG/1
TABLET ORAL
Qty: 28 TABLET | Refills: 2 | Status: SHIPPED | OUTPATIENT
Start: 2024-05-21

## 2024-05-21 RX ORDER — SERTRALINE HYDROCHLORIDE 100 MG/1
50 TABLET, FILM COATED ORAL 3 TIMES DAILY
Qty: 47 TABLET | Refills: 2 | Status: SHIPPED | OUTPATIENT
Start: 2024-05-21

## 2024-07-23 DIAGNOSIS — F42.8 OTHER OBSESSIVE-COMPULSIVE DISORDERS: ICD-10-CM

## 2024-07-23 DIAGNOSIS — F41.1 GENERALIZED ANXIETY DISORDER: ICD-10-CM

## 2024-07-23 RX ORDER — LORAZEPAM 1 MG/1
1 TABLET ORAL NIGHTLY
Qty: 30 TABLET | Refills: 2 | Status: SHIPPED | OUTPATIENT
Start: 2024-07-23

## 2024-07-23 RX ORDER — SERTRALINE HYDROCHLORIDE 100 MG/1
50 TABLET, FILM COATED ORAL 3 TIMES DAILY
Qty: 45 TABLET | Refills: 11 | Status: SHIPPED | OUTPATIENT
Start: 2024-07-23

## 2024-07-23 RX ORDER — GABAPENTIN 100 MG/1
200 CAPSULE ORAL 2 TIMES DAILY
Qty: 120 CAPSULE | Refills: 11 | Status: SHIPPED | OUTPATIENT
Start: 2024-07-23

## 2024-08-02 NOTE — PROGRESS NOTES
Provider Impressions     This is a pleasant 24-year-old right-handed male with past medical history significant for spastic diplegia cerebral palsy, asthma, Austism spectrum, dystonia, femoral anteversion, who presents for follow-up of spastic cerebral palsy. Physical exam is reassuring for lack of neurological compromise. Symptoms and physical exam findings are consistent with spastic quadriplegic cerebral palsy, GMFCS III.     -Continue medications. Consider going up on baclofen to 20 mg four times per day  -Resume daily exercises  -Consider another round of physical therapy to help you get back on track.   -Consider Botox injections to bilateral gastrocnemius complex, tibialis posterior, adductors, hamstrings. Call me if you have like me to order this.  -Follow-up as needed        The patient expressed understanding and agreement with the assessment and plan. Patient encouraged to contact us should they have any questions, concerns, or any changes in symptoms.      Thank you for allowing me to participate in the care of your patient.     ** Dictated with voice recognition software, please forgive any errors in grammar and/or spelling **      Chief Complaint     Follow up on spastic diplegia cerebral palsy.      History of Present Illness    This is a pleasant 24-year-old right-handed male with past medical history significant for spastic diplegia cerebral palsy, asthma, Austism spectrum, dystonia, femoral anteversion, who presents for follow-up.      He is here today with an aid.     He was last seen here over a year ago on   6/19/2024, at which point I advised him to continue his medications and exercises.  We had previously talked about Botox.      He lives with a roommate in a home care company, he is independent but needs help with showers.    He falls at home because he hasn't been using his crutches there as much. No injury. Catches himself on his forearms.    He does HEP mostly on the weekend now.    He  rates his pain as a 0/10.     Otherwise, there've been no changes to his medications or past medical history since the last visit .     ___________________  8/24/2020: Continue Colace, consider senna, continue physical therapy and home exercises, consider Botox  3/22/2021: Try Voltaren gel, continue daily home exercises, follow-up 1 year or sooner if needed  3/21/2022: Continue daily home exercises, continue AFOs, follow-up 1 year or sooner if needed  6/19/2023: Continue exercises, AFOs, medications, consider Botox, follow-up as needed  8/5/24: Continue exercises, AFOs, medications, consider Botox, follow-up as needed  _________________  As a reminder:      TIMELINE OF COMPLAINT(S):     Patient was born at 26 weeks and 5 days, 2 lbs and 2 oz. He was in the NICU for 10 weeks at [facility] in [place]. 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 On day 7 he had an isolated perforation of his bowel, which was followed by a primary reanastomosis. He also had retinopathy of prematurity of both eyes and had it corrected surgically. He also had a hernia repair as an infant. When he was 3 years old, he had strabismus surgery for both eyes. He had his first Botox shots around the age of 4 at [facility].     The family moved to [place] in March of 2006 where they established care with Dr. Beck in [place], where he underwent serial casting. After Dr. Beck retired patient was moved to the care of Dr. Dominique at [facility]. Botox was resumed. He was also doing hippo therapy with PT and started ambulating without a reverse walker at the age of 10. At that time the patient had to decide between a dorsal rhizotomy vs intrathecal baclofen pump, the family decided to go with the baclofen pump which was surgically put it in August of 2012. However the catheter was found to be kinked and the patient did not actually receive any Baclofen until November 2012 (catheter  revision, placed at (T7-T8), at which point he had developed contractures of hamstrings, adductors, and hip flexors bilaterally. The surgical releases was done between 7408-3263.      From the time of the surgical releases until 2018, patient and family were working on adjusting baclofen doses to the appropriate levels. Patients care was transferred to Dr. Raphael Barros at [facility]. During that time patient was ambulating with canes and using wheelchair for community distances. In August of 2018 the pump battery was due for a change and the pump was changed. However patient developed an infection and about 10 days after the change, pump and catheter were removed. This was followed by a spinal leak requiring a patch. All together patient was hospitalized for about 8 days. Since then the patient has not had a Baclofen pump. He has been using oral Baclofen (20 mg TID) since pump removal. He has not had any significant side effects from Baclofen.      He is being followed by orthopedic surgeon Dr. Jewell who is monitoring his hip development since about 2013. As per parents she is happy with his range and motion and overall development.      At this point the family is happy with where he is physically and developmentally. Their primary goal at this time is maintaining function, especially ADLs. They would also like for him to continue ambulating as independently as possible. At this time patient is working with PT (Brianna Riley) and uses bilateral AFO's which he has been using since he was a baby. He had new AFOs and Lofstrand crutches ordered a couple of months ago by Dr. Jewell, but they're waiting for his supplementary insurance to kick in. He goes to a work-related program where he does daily exercises, attends classes on life skills and current events. 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 One of his hobbies also includes filming elevators.      Patient is on a  normal diet. He is continent of bladder with normal bladder function. He is continent of bowel, takes MiraLAX daily but still has frequently hard stools and often goes days without having a bowel movement.     PHYSICAL THERAPY: Yes (Brianna Riley at [facility] in [place]). Goes once a week, no specific home exercises.  CHIROPRACTIC MANIPULATION: No  ACUPUNCTURE TREATMENTS: No  DEEP TISSUE MASSAGE THERAPY: No  OSTEOPATHIC MANIPULATION THERAPY: No  INJECTIONS: Yes, Botox. No longer getting these, but family would like to stay ahead of any developing spasticity. Last Botox was in 2011.  EMG/NCS: No  FUNCTIONAL HISTORY: The patient is independent in all ADLs, including toileting and dressing. He is mobile using bilateral Lofstrand crutches as assistive device. He also continues to use a wheelchair for long community distances.      SOCIAL HISTORY:  Lives in: [place]  Lives with: Parent, three brothers  Occupation: Student  Smoking: No  Alcohol: No  Drugs: No     ______________  ROS: The patient denies any bowel or bladder incontinence/accidents, night sweats, fevers, chills, recent significant weight loss. A 14 point review of systems was reviewed with the patient and his parents and is as above and otherwise negative. ROS questionnaire positive for falls, constipation, sleep disturbances, difficulty with bathing and toileting.      Physical Exam  GEN - Alert, well-developed, well-nourished, no acute distress   PSYCH - Cooperative, appropriate mood and affect   HEENT - NC/AT   RESP - Non-labored respirations, equal expansion   CV - warm and well-perfused, No cyanosis or edema in extremities.  ABD- soft, ND   Skin: No visible rash     Gait similar  I took the AFOs off , looks good, no sores or erythematous areas on lower legs.     Previously:  Hip flexion: Full range of motion bilaterally     Knees: Lacks about 10 degrees for full knee extension bilaterally, catch with resistance b/l knee flexion and  extension     Ankles: 5 degrees of plantar flexion with knee flexed, 10 degrees with knee extended on R. equinovarus deformity of L ankle. 5 degrees of plantar flexion with knee flexed, 10 degrees with knee extended on L.      NEURO - patient had difficulty following instructions for manual muscle testing in the upper and lower extremity is, but all muscles were at least antigravity with resistance, at least 4/5.   Tone 2-3/4 patient in bilateral hamstrings, gastrocnemius complex, tibialis posterior , equinovarus deformity,  GAIT - Crouched scissoring gait.   Sustained clonus at the right, diminished on the left.  Brisk deep tendon reflexes in the upper and lower extremities

## 2024-08-02 NOTE — PATIENT INSTRUCTIONS
-Continue medications. Consider going up on baclofen to 20 mg four times per day  -Resume daily exercises  -Consider another round of physical therapy to help you get back on track.   -Consider Botox injections to bilateral gastrocnemius complex, tibialis posterior, adductors, hamstrings. Call me if you have like me to order this.  -Follow-up as needed

## 2024-08-05 ENCOUNTER — APPOINTMENT (OUTPATIENT)
Dept: PHYSICAL MEDICINE AND REHAB | Facility: CLINIC | Age: 24
End: 2024-08-05
Payer: COMMERCIAL

## 2024-08-05 VITALS
WEIGHT: 133 LBS | BODY MASS INDEX: 22.16 KG/M2 | HEIGHT: 65 IN | SYSTOLIC BLOOD PRESSURE: 123 MMHG | HEART RATE: 92 BPM | DIASTOLIC BLOOD PRESSURE: 78 MMHG | TEMPERATURE: 98.1 F | OXYGEN SATURATION: 95 %

## 2024-08-05 DIAGNOSIS — G80.1 SPASTIC DIPLEGIC CEREBRAL PALSY (MULTI): Primary | ICD-10-CM

## 2024-08-05 DIAGNOSIS — R26.9 GAIT DISTURBANCE: ICD-10-CM

## 2024-08-05 PROCEDURE — 3008F BODY MASS INDEX DOCD: CPT | Performed by: PHYSICAL MEDICINE & REHABILITATION

## 2024-08-05 PROCEDURE — 99213 OFFICE O/P EST LOW 20 MIN: CPT | Performed by: PHYSICAL MEDICINE & REHABILITATION

## 2024-08-05 ASSESSMENT — PAIN SCALES - GENERAL: PAINLEVEL: 0-NO PAIN

## 2024-10-17 ENCOUNTER — APPOINTMENT (OUTPATIENT)
Dept: BEHAVIORAL HEALTH | Facility: CLINIC | Age: 24
End: 2024-10-17
Payer: COMMERCIAL

## 2024-10-17 VITALS
BODY MASS INDEX: 23.45 KG/M2 | SYSTOLIC BLOOD PRESSURE: 116 MMHG | WEIGHT: 140.9 LBS | DIASTOLIC BLOOD PRESSURE: 72 MMHG | HEART RATE: 101 BPM | TEMPERATURE: 98.8 F

## 2024-10-17 DIAGNOSIS — F41.1 GENERALIZED ANXIETY DISORDER: ICD-10-CM

## 2024-10-17 DIAGNOSIS — F84.0 AUTISM SPECTRUM DISORDER (HHS-HCC): ICD-10-CM

## 2024-10-17 DIAGNOSIS — F42.8 OTHER OBSESSIVE-COMPULSIVE DISORDERS: Primary | ICD-10-CM

## 2024-10-17 DIAGNOSIS — Z79.899 HIGH RISK MEDICATION USE: ICD-10-CM

## 2024-10-17 DIAGNOSIS — G80.1 SPASTIC DIPLEGIC CEREBRAL PALSY (MULTI): ICD-10-CM

## 2024-10-17 LAB
AMPHETAMINES UR QL SCN: NORMAL
BARBITURATES UR QL SCN: NORMAL
BZE UR QL SCN: NORMAL
CANNABINOIDS UR QL SCN: NORMAL
CREAT UR-MCNC: 136.9 MG/DL (ref 20–370)
PCP UR QL SCN: NORMAL

## 2024-10-17 PROCEDURE — 80361 OPIATES 1 OR MORE: CPT

## 2024-10-17 PROCEDURE — 80358 DRUG SCREENING METHADONE: CPT

## 2024-10-17 PROCEDURE — 80365 DRUG SCREENING OXYCODONE: CPT

## 2024-10-17 PROCEDURE — 99215 OFFICE O/P EST HI 40 MIN: CPT | Performed by: PSYCHIATRY & NEUROLOGY

## 2024-10-17 PROCEDURE — 80373 DRUG SCREENING TRAMADOL: CPT

## 2024-10-17 PROCEDURE — 1036F TOBACCO NON-USER: CPT | Performed by: PSYCHIATRY & NEUROLOGY

## 2024-10-17 PROCEDURE — 80346 BENZODIAZEPINES1-12: CPT

## 2024-10-17 PROCEDURE — 80368 SEDATIVE HYPNOTICS: CPT

## 2024-10-17 PROCEDURE — 80307 DRUG TEST PRSMV CHEM ANLYZR: CPT

## 2024-10-17 PROCEDURE — 80354 DRUG SCREENING FENTANYL: CPT

## 2024-10-17 PROCEDURE — 82570 ASSAY OF URINE CREATININE: CPT

## 2024-10-17 RX ORDER — LORAZEPAM 1 MG/1
1 TABLET ORAL NIGHTLY
Qty: 28 TABLET | Refills: 3 | Status: SHIPPED | OUTPATIENT
Start: 2024-10-17

## 2024-10-17 ASSESSMENT — ENCOUNTER SYMPTOMS
SHORTNESS OF BREATH: 0
ABDOMINAL PAIN: 0
AGITATION: 0
SLEEP DISTURBANCE: 0
TREMORS: 0
ACTIVITY CHANGE: 0
SPEECH DIFFICULTY: 0
SEIZURES: 0

## 2024-10-17 NOTE — PROGRESS NOTES
Outpatient Adult IDD Psychiatry    Present for appointment: Evert (also accompanied to visit by [facility] staff (Edith)).    SUBJECTIVE    Evert has generally been doing well lately, without any acute health problems or need for other treatment changes.    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  Attends [employer] two days per week as well.    Says he's getting along well with his house-mate and the staff at home.    Sounds like there's been some internal conflicts within the [employer] community that has him a bit anxious or worked-up.    Plans to visit family in [place] for the holidays.    Does not seem to be exhibiting symptoms suggesting depression (more withdrawn, less talkative, etc).    Says he's able to get to sleep fairly easily at night.    Review of Systems   Constitutional:  Negative for activity change.   Respiratory:  Negative for shortness of breath.    Cardiovascular:  Negative for chest pain.   Gastrointestinal:  Negative for abdominal pain.   Neurological:  Negative for tremors, seizures and speech difficulty.   Psychiatric/Behavioral:  Negative for agitation, behavioral problems and sleep disturbance.       Controlled Substance Evaluation  OARRS/PDMP reviewed: Tenzin Brown MD on 10/17/2024  6:30 AM  Is the patient prescribed a combination of a benzodiazepine and opioid? No  I have personally reviewed the OARRS report for Paco Adams.   I have considered the risks of abuse, dependence, addiction and diversion.    I believe that it is clinically appropriate for Paco Adams to be prescribed this medication.    Last Urine Drug Screen:  Recent Results (from the past 8760 hours)   Opiate/Opioid/Benzo Prescription Compliance Confirmation    Collection Time: 10/18/23  8:58 AM   Result Value Ref Range    Clonazepam <25 <25 ng/mL    7-Aminoclonazepam <25 <25 ng/mL    Alprazolam <25 <25 ng/mL    Alpha-Hydroxyalprazolam <25 <25 ng/mL     Midazolam <25 <25 ng/mL    Alpha-Hydroxymidazolam <25 <25 ng/mL    Chlordiazepoxide <25 <25 ng/mL    Diazepam <25 <25 ng/mL    Nordiazepam <25 <25 ng/mL    Temazepam <25 <25 ng/mL    Oxazepam <25 <25 ng/mL    Lorazepam 655 (H) <25 ng/mL    Methadone <25 <25 ng/mL    EDDP <25 <25 ng/mL    6-Acetylmorphine <25 <25 ng/mL    Codeine <50 <50 ng/mL    Hydrocodone <25 <25 ng/mL    Hydromorphone <25 <25 ng/mL    Morphine  <50 <50 ng/mL    Norhydrocodone <25 <25 ng/mL    Noroxycodone <25 <25 ng/mL    Oxycodone <25 <25 ng/mL    Oxymorphone <25 <25 ng/mL    Fentanyl <2.5 <2.5 ng/mL    Norfentanyl <2.5 <2.5 ng/mL    Tramadol <50 <50 ng/mL    O-Desmethyltramadol <50 <50 ng/mL    Zolpidem <25 <25 ng/mL    Zolpidem Metabolite (ZCA) <25 <25 ng/mL   Opiate/Opioid/Benzo Prescription Compliance Screen    Collection Time: 10/18/23  8:58 AM   Result Value Ref Range    Creatinine, Urine Random 127.2 20.0 - 370.0 mg/dL    Amphetamine Screen, Urine Presumptive Negative Presumptive Negative    Barbiturate Screen, Urine Presumptive Negative Presumptive Negative    Cannabinoid Screen, Urine Presumptive Negative Presumptive Negative    Cocaine Metabolite Screen, Urine Presumptive Negative Presumptive Negative    PCP Screen, Urine Presumptive Negative Presumptive Negative     Controlled Substance Agreement: 10/17/2024  Reviewed Controlled Substance Agreement, including but not limited to:  Benefits, risks, and alternatives to treatment with a controlled substance medication(s).    Prescribed Controlled Substances:  > Benzodiazepines: Lorazepam  What is the patient's goal of therapy? Sleep and reduced anxiety  Is this being achieved with current treatment? Yes  Activities of Daily Living:   Is your overall impression that this patient is benefiting (symptom reduction outweighs side effects) from benzodiazepine therapy? Yes   1. Physical Functioning: Same  2. Family Relationship: Same  3. Social Relationship: Same  4. Mood: Same  5. Sleep  Patterns: Same  6. Overall Function: Same     MEDICATION HISTORY  Buspirone - over-activating, possible serotonin syndrome  Fluvoxamine - had been helpful in the past    CURRENT MEDICATIONS    Current Outpatient Medications:     acetaminophen (Tylenol) 325 mg tablet, Take as needed q6 hours for pain, Disp: 90 tablet, Rfl: 3    albuterol (Ventolin HFA) 90 mcg/actuation inhaler, Inhale 2 puffs every 4 hours if needed for wheezing or shortness of breath., Disp: 8 g, Rfl: 11    baclofen (Lioresal) 20 mg tablet, Take 1 tablet (20 mg) by mouth 3 times a day., Disp: 90 tablet, Rfl: 11    Blistex Medicated 0.6-0.5-1.1-0.5 % ointment ointment, Use as needed for blisters, Disp: 6 g, Rfl: 11    cetirizine (ZyrTEC) 10 mg tablet, Take 1 tablet (10 mg) by mouth once daily with breakfast., Disp: 90 tablet, Rfl: 1    Cough Drops 5.8 mg lozenge, Use daily as needed for cough, Disp: 80 lozenge, Rfl: 3    docusate sodium (Colace) 100 mg capsule, 1 capsules by mouth daily, Disp: 60 capsule, Rfl: 11    gabapentin (Neurontin) 100 mg capsule, Take 2 capsules (200 mg) by mouth 2 times a day., Disp: 120 capsule, Rfl: 11    LORazepam (Ativan) 1 mg tablet, Take 1 tablet (1 mg) by mouth once daily at bedtime., Disp: 30 tablet, Rfl: 2    Milk of Magnesia 400 mg/5 mL suspension, Take 5 mL by mouth once daily as needed for constipation., Disp: 360 mL, Rfl: 11    montelukast (Singulair) 10 mg tablet, Take 1 tablet (10 mg) by mouth once daily at bedtime., Disp: 90 tablet, Rfl: 3    Mucinex DM  mg 12 hr tablet, Take 1 tablet by mouth every 12 hours if needed for cough (cough). Do not crush, chew, or split., Disp: 90 tablet, Rfl: 3    multivitamin tablet, Take 1 tablet by mouth once daily., Disp: 30 tablet, Rfl: 11    polyethylene glycol (Glycolax, Miralax) 17 gram/dose powder, One half capful in 8 ounces of fluid every other day., Disp: 850 g, Rfl: 11    pramoxine-calamine (Calamine Plus, pramox-calamin,) 1-8 % lotion, Use as needed for  dermatitis, Disp: 177 mL, Rfl: 3    sertraline (Zoloft) 100 mg tablet, Take 0.5 tablets (50 mg) by mouth 3 times a day., Disp: 45 tablet, Rfl: 11    triamcinolone (Kenalog) 0.1 % cream, Apply topically 2 times a day., Disp: 400 g, Rfl: 2    triamcinolone (Nasacort Allergy) 55 mcg nasal inhaler, Administer 2 sprays into each nostril once daily., Disp: 16.5 g, Rfl: 11     SOCIAL HISTORY  Living situation Waiver home with 1 male house-mate   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   Bx Specialist ?   Nicotine None   Alcohol None   Other drugs None     OBJECTIVE    Visit Vitals  /72 (BP Location: Right arm, Patient Position: Sitting)   Pulse 101   Temp 37.1 °C (98.8 °F)   Wt 63.9 kg (140 lb 14.4 oz)   BMI 23.45 kg/m²   Smoking Status Never   BSA 1.71 m²      Lab Results   Component Value Date    HGB 16.5 10/18/2023     10/18/2023    NEUTROABS 2.23 10/18/2023    GLUCOSE 85 10/18/2023     10/18/2023    K 4.0 10/18/2023    CO2 26 10/18/2023    CALCIUM 9.7 10/18/2023    CREATININE 0.68 10/18/2023    AST 19 10/18/2023    ALT 15 10/18/2023    CHOL 175 10/18/2023    LDLF 80 05/07/2019    TRIG 57 10/18/2023     Therapeutic Drug Monitoring  N/A    Electrocardiograms  None in chart    MENTAL STATUS EXAM  General/Appearance: Appropriate dress/hygiene/grooming.  Attitude/Behavior: Actively engages with interview. Calm/pleasant.  Speech/Communication: Hypertalkative. Interrupts or talks over others.  Motor: Stimming.  Gait: Not assessed at this visit.  Mood: Appears to be in good spirits.  Affect: Mostly stable, but gets rather giddy/silly when bringing up certain subjects.  Thought processes: Perseverative.  Thought content: Obsessions. Perseverations.   Perception: Does not appear to be experiencing or responding to  "hallucinations.  Sensorium/Cognition: Alert, awake, oriented to person/place/time. Difficulty shifting between topics.  Memory: Recent & remote recall is intact.  Insight: Minimal.  Judgment: Fair. Behaviorally under control.     ASSESSMENT  Evert has generally been doing well.  He's had some \"typical\" ups and downs with his anxiety based on world events and things that are happening in his online community.  I don't think that there is clear need to make medication changes right now.  We will meet again in 3 months, or sooner if something changes that warrants a re-evaluation.  Sample collected today in office for yearly screening drug test for controlled substance prescribing.    PROBLEM LIST  ASD  BIANCA  OCD    PLAN  -- Continue lorazepam 1mg at bedtime for anxiety and OCD  -- Continue sertraline 50mg TID for anxiety and OCD  -- Continue gabapentin 200mg BID for anxiety  -- Follow up 3 months    Tenzin Brown MD    Prep time on date of the patient encounter: 5 minutes   Time spent directly with patient/family/caregiver: 40 minutes   Additional time spent on patient care activities: 0 minutes   Documentation time: 5 minutes   Other time spent: 0 minutes   Total time on date of patient encounter: 50 minutes      "

## 2024-10-17 NOTE — PATIENT INSTRUCTIONS
We will continue current treatments for Evert.    Next appointment: Monday 1/20/25 at 9:45 AM virtual.

## 2024-10-22 LAB
1OH-MIDAZOLAM UR CFM-MCNC: <25 NG/ML
6MAM UR CFM-MCNC: <25 NG/ML
7AMINOCLONAZEPAM UR CFM-MCNC: <25 NG/ML
A-OH ALPRAZ UR CFM-MCNC: <25 NG/ML
ALPRAZ UR CFM-MCNC: <25 NG/ML
CHLORDIAZEP UR CFM-MCNC: <25 NG/ML
CLONAZEPAM UR CFM-MCNC: <25 NG/ML
CODEINE UR CFM-MCNC: <50 NG/ML
DIAZEPAM UR CFM-MCNC: <25 NG/ML
EDDP UR CFM-MCNC: <25 NG/ML
FENTANYL UR CFM-MCNC: <2.5 NG/ML
HYDROCODONE CTO UR CFM-MCNC: <25 NG/ML
HYDROMORPHONE UR CFM-MCNC: <25 NG/ML
LORAZEPAM UR CFM-MCNC: 782 NG/ML
METHADONE UR CFM-MCNC: <25 NG/ML
MIDAZOLAM UR CFM-MCNC: <25 NG/ML
MORPHINE UR CFM-MCNC: <50 NG/ML
NORDIAZEPAM UR CFM-MCNC: <25 NG/ML
NORFENTANYL UR CFM-MCNC: <2.5 NG/ML
NORHYDROCODONE UR CFM-MCNC: <25 NG/ML
NOROXYCODONE UR CFM-MCNC: <25 NG/ML
NORTRAMADOL UR-MCNC: <50 NG/ML
OXAZEPAM UR CFM-MCNC: <25 NG/ML
OXYCODONE UR CFM-MCNC: <25 NG/ML
OXYMORPHONE UR CFM-MCNC: <25 NG/ML
TEMAZEPAM UR CFM-MCNC: <25 NG/ML
TRAMADOL UR CFM-MCNC: <50 NG/ML
ZOLPIDEM UR CFM-MCNC: <25 NG/ML
ZOLPIDEM UR-MCNC: <25 NG/ML

## 2024-11-05 DIAGNOSIS — J30.89 SEASONAL ALLERGIC RHINITIS DUE TO OTHER ALLERGIC TRIGGER: ICD-10-CM

## 2024-11-06 RX ORDER — CETIRIZINE HYDROCHLORIDE 10 MG/1
TABLET ORAL
Qty: 28 TABLET | Refills: 10 | Status: SHIPPED | OUTPATIENT
Start: 2024-11-06

## 2025-01-02 DIAGNOSIS — J30.9 ALLERGIC RHINITIS, UNSPECIFIED SEASONALITY, UNSPECIFIED TRIGGER: ICD-10-CM

## 2025-01-03 RX ORDER — TRIAMCINOLONE ACETONIDE 55 UG/1
2 SPRAY, METERED NASAL DAILY
Qty: 16.9 ML | Refills: 10 | Status: SHIPPED | OUTPATIENT
Start: 2025-01-03

## 2025-01-20 ENCOUNTER — APPOINTMENT (OUTPATIENT)
Dept: BEHAVIORAL HEALTH | Facility: CLINIC | Age: 25
End: 2025-01-20
Payer: COMMERCIAL

## 2025-01-20 DIAGNOSIS — F84.0 AUTISM SPECTRUM DISORDER (HHS-HCC): ICD-10-CM

## 2025-01-20 DIAGNOSIS — F41.1 GENERALIZED ANXIETY DISORDER: ICD-10-CM

## 2025-01-20 DIAGNOSIS — G80.1 SPASTIC DIPLEGIC CEREBRAL PALSY (MULTI): ICD-10-CM

## 2025-01-20 DIAGNOSIS — F42.8 OTHER OBSESSIVE-COMPULSIVE DISORDERS: Primary | ICD-10-CM

## 2025-01-20 PROCEDURE — 99215 OFFICE O/P EST HI 40 MIN: CPT | Performed by: PSYCHIATRY & NEUROLOGY

## 2025-01-20 RX ORDER — LORAZEPAM 1 MG/1
1 TABLET ORAL NIGHTLY
Qty: 28 TABLET | Refills: 2 | Status: SHIPPED | OUTPATIENT
Start: 2025-01-20

## 2025-01-20 ASSESSMENT — ENCOUNTER SYMPTOMS
TREMORS: 0
AGITATION: 0
SEIZURES: 0
SLEEP DISTURBANCE: 0
SHORTNESS OF BREATH: 0
ACTIVITY CHANGE: 0
SPEECH DIFFICULTY: 0
ABDOMINAL PAIN: 0

## 2025-01-20 NOTE — PROGRESS NOTES
"Outpatient Adult IDD Psychiatry    A HIPAA-compliant interactive audio and video telecommunication system which permits real time communications between the patient (at the originating site) and provider (at the distant site) was utilized to provide this telehealth service.     The patient, family, caregivers and guardian (as appropriate) have provided consent to conduct treatment via this telehealth service.      The patient's identity and physical location were verified at the time of this visit.     Present for appointment: Evert.    Appointment location: Home.   [address] [place] [address]     SUBJECTIVE    Evert has been doing well since we last met.  Mom sent me a My Chart message in November informing me that Evert's brother had recently  by suicide.  Evert doesn't talk much about this today, except to say that his parents are \"taking a break\" from social media and that there were a lot of people at the .  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  He tells me about the inaugural celebration of [employer] (), founded by [employer] in .  He seems a bit anxious about today's Presidential inauguration.  Still attending [employer] and [employer].  Things seem to be going well at home, and I have not received any updates from his provider agency regarding emotional or behavioral changes or concerns.  He does not seem to be exhibiting symptoms suggesting depression (more withdrawn, less talkative, etc).    Review of Systems   Constitutional:  Negative for activity change.   Respiratory:  Negative for shortness of breath.    Cardiovascular:  Negative for chest pain.   Gastrointestinal:  Negative for abdominal pain.   Neurological:  Negative for tremors, seizures and speech difficulty.   Psychiatric/Behavioral:  Negative for agitation, behavioral problems and sleep disturbance.      [facility] Controlled Substance " Evaluation  OARRS/PDMP reviewed: Tenzin Brown MD on 1/20/2025  6:58 AM  Is the patient prescribed a combination of a benzodiazepine and opioid? No  I have personally reviewed the OARRS report for Paco Adams.   I have considered the risks of abuse, dependence, addiction and diversion.    I believe that it is clinically appropriate for Paco Adams to be prescribed this medication.    Last Urine Drug Screen:  Recent Results (from the past 8760 hours)   Confirmation Opiate/Opioid/Benzo Prescription Compliance    Collection Time: 10/17/24 10:05 AM   Result Value Ref Range    Clonazepam <25 <25 ng/mL    7-Aminoclonazepam <25 <25 ng/mL    Alprazolam <25 <25 ng/mL    Alpha-Hydroxyalprazolam <25 <25 ng/mL    Midazolam <25 <25 ng/mL    Alpha-Hydroxymidazolam <25 <25 ng/mL    Chlordiazepoxide <25 <25 ng/mL    Diazepam <25 <25 ng/mL    Nordiazepam <25 <25 ng/mL    Temazepam <25 <25 ng/mL    Oxazepam <25 <25 ng/mL    Lorazepam 782 (H) <25 ng/mL    Methadone <25 <25 ng/mL    EDDP <25 <25 ng/mL    6-Acetylmorphine <25 <25 ng/mL    Codeine <50 <50 ng/mL    Hydrocodone <25 <25 ng/mL    Hydromorphone <25 <25 ng/mL    Morphine  <50 <50 ng/mL    Norhydrocodone <25 <25 ng/mL    Noroxycodone <25 <25 ng/mL    Oxycodone <25 <25 ng/mL    Oxymorphone <25 <25 ng/mL    Fentanyl <2.5 <2.5 ng/mL    Norfentanyl <2.5 <2.5 ng/mL    Tramadol <50 <50 ng/mL    O-Desmethyltramadol <50 <50 ng/mL    Zolpidem <25 <25 ng/mL    Zolpidem Metabolite (ZCA) <25 <25 ng/mL   Screen Opiate/Opioid/Benzo Prescription Compliance    Collection Time: 10/17/24 10:05 AM   Result Value Ref Range    Creatinine, Urine Random 136.9 20.0 - 370.0 mg/dL    Amphetamine Screen, Urine Presumptive Negative Presumptive Negative    Barbiturate Screen, Urine Presumptive Negative Presumptive Negative    Cannabinoid Screen, Urine Presumptive Negative Presumptive Negative    Cocaine Metabolite Screen, Urine Presumptive Negative Presumptive Negative    PCP  Screen, Urine Presumptive Negative Presumptive Negative     Controlled Substance Agreement: 10/17/2024  Reviewed Controlled Substance Agreement, including but not limited to:  Benefits, risks, and alternatives to treatment with a controlled substance medication(s).    Prescribed Controlled Substances:  > Benzodiazepines: Lorazepam  What is the patient's goal of therapy? Sleep and reduced anxiety  Is this being achieved with current treatment? Yes  Activities of Daily Living:   Is your overall impression that this patient is benefiting (symptom reduction outweighs side effects) from benzodiazepine therapy? Yes   1. Physical Functioning: Same  2. Family Relationship: Same  3. Social Relationship: Same  4. Mood: Same  5. Sleep Patterns: Same  6. Overall Function: Same     MEDICATION HISTORY  Buspirone - over-activating, possible serotonin syndrome  Fluvoxamine - had been helpful in the past    CURRENT MEDICATIONS    Current Outpatient Medications:     acetaminophen (Tylenol) 325 mg tablet, Take as needed q6 hours for pain, Disp: 90 tablet, Rfl: 3    albuterol (Ventolin HFA) 90 mcg/actuation inhaler, Inhale 2 puffs every 4 hours if needed for wheezing or shortness of breath., Disp: 8 g, Rfl: 11    baclofen (Lioresal) 20 mg tablet, Take 1 tablet (20 mg) by mouth 3 times a day., Disp: 90 tablet, Rfl: 11    Blistex Medicated 0.6-0.5-1.1-0.5 % ointment ointment, Use as needed for blisters, Disp: 6 g, Rfl: 11    cetirizine (ZyrTEC) 10 mg tablet, 1 TAB BY MOUTH ONCE A DAY WITH BREAKFAST, Disp: 28 tablet, Rfl: 10    Cough Drops 5.8 mg lozenge, Use daily as needed for cough, Disp: 80 lozenge, Rfl: 3    docusate sodium (Colace) 100 mg capsule, 1 capsules by mouth daily, Disp: 60 capsule, Rfl: 11    gabapentin (Neurontin) 100 mg capsule, Take 2 capsules (200 mg) by mouth 2 times a day., Disp: 120 capsule, Rfl: 11    LORazepam (Ativan) 1 mg tablet, Take 1 tablet (1 mg) by mouth once daily at bedtime., Disp: 28 tablet, Rfl: 3     Milk of Magnesia 400 mg/5 mL suspension, Take 5 mL by mouth once daily as needed for constipation., Disp: 360 mL, Rfl: 11    montelukast (Singulair) 10 mg tablet, Take 1 tablet (10 mg) by mouth once daily at bedtime., Disp: 90 tablet, Rfl: 3    Mucinex DM  mg 12 hr tablet, Take 1 tablet by mouth every 12 hours if needed for cough (cough). Do not crush, chew, or split., Disp: 90 tablet, Rfl: 3    multivitamin tablet, Take 1 tablet by mouth once daily., Disp: 30 tablet, Rfl: 11    polyethylene glycol (Glycolax, Miralax) 17 gram/dose powder, One half capful in 8 ounces of fluid every other day., Disp: 850 g, Rfl: 11    pramoxine-calamine (Calamine Plus, pramox-calamin,) 1-8 % lotion, Use as needed for dermatitis, Disp: 177 mL, Rfl: 3    sertraline (Zoloft) 100 mg tablet, Take 0.5 tablets (50 mg) by mouth 3 times a day., Disp: 45 tablet, Rfl: 11    triamcinolone (Kenalog) 0.1 % cream, Apply topically 2 times a day., Disp: 400 g, Rfl: 2    triamcinolone (Nasacort) 55 mcg nasal inhaler, 2 SPRAYS INTO EACH NOSTRIL ONCE A DAY, Disp: 16.9 mL, Rfl: 10     SOCIAL HISTORY  Living situation Waiver home with 1 male house-mate   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   Guardianship Parents are co-guardians   SSA ?   Bx Specialist ?   Nicotine None   Alcohol None   Other drugs None     OBJECTIVE     Lab Results   Component Value Date    HGB 16.5 10/18/2023     10/18/2023    NEUTROABS 2.23 10/18/2023    GLUCOSE 85 10/18/2023     10/18/2023    K 4.0 10/18/2023    CO2 26 10/18/2023    CALCIUM 9.7 10/18/2023    CREATININE 0.68 10/18/2023    AST 19 10/18/2023    ALT 15 10/18/2023    CHOL 175 10/18/2023    LDLF 80 05/07/2019    TRIG 57 10/18/2023     Therapeutic Drug Monitoring  N/A    Electrocardiograms  None in chart    MENTAL STATUS EXAM  General/Appearance: Appropriate  dress/hygiene/grooming.  Attitude/Behavior: Actively engages with interview. Calm/pleasant.  Speech/Communication: Hypertalkative.  Motor: Stimming.  Gait: Not assessed at this visit.  Mood: Appears to be in good spirits.  Affect: Full range.  Thought processes: Perseverative.  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   Perception: Does not appear to be experiencing or responding to hallucinations.  Sensorium/Cognition: Alert, awake, oriented to person/place/time. Difficulty shifting between topics.  Memory: Recent & remote recall is intact.  Insight: Minimal.  Judgment: Fair. Behaviorally under control.     ASSESSMENT  Evert has generally been doing well.  I don't detect any current evidence of depression or atypical bereavement.  Anxiety levels seem to be about at their usual baseline.  He would like to meet in person again at the next visit, but I will have to coordinate this with his provider agency.    PROBLEM LIST  ASD  BIANCA  OCD    PLAN  -- Continue lorazepam 1mg at bedtime for anxiety and OCD  -- Continue sertraline 50mg TID for anxiety and OCD  -- Continue gabapentin 200mg BID for anxiety  -- Follow up 3 months    Tenzin Brown MD    Prep time on date of the patient encounter: 0 minutes   Time spent directly with patient/family/caregiver: 35 minutes   Additional time spent on patient care activities: 0 minutes   Documentation time: 5 minutes   Other time spent: 0 minutes   Total time on date of patient encounter: 40 minutes

## 2025-01-30 ENCOUNTER — TELEPHONE (OUTPATIENT)
Dept: BEHAVIORAL HEALTH | Facility: CLINIC | Age: 25
End: 2025-01-30
Payer: COMMERCIAL

## 2025-01-30 DIAGNOSIS — F41.1 GENERALIZED ANXIETY DISORDER: Primary | ICD-10-CM

## 2025-01-30 DIAGNOSIS — F42.8 OTHER OBSESSIVE-COMPULSIVE DISORDERS: ICD-10-CM

## 2025-01-30 RX ORDER — LORAZEPAM 0.5 MG/1
0.5 TABLET ORAL EVERY MORNING
Qty: 14 TABLET | Refills: 0 | Status: SHIPPED | OUTPATIENT
Start: 2025-01-30 | End: 2025-02-13

## 2025-01-30 NOTE — TELEPHONE ENCOUNTER
Update per Nahun at [facility]:    As of 1/26/2025, Evert has been experiencing Anxiety. Symptoms include obsessive negative thoughts about the elevator community, incomplete sentences, irregular eating patterns, slow walking, and a lack of focus on daily activities. Kavya and Tobi are aware of the situation and have regular phone check-ins.    PLAN  -- Temporarily increase lorazepam to 0.5mg every morning & 1mg at bedtime for anxiety and OCD for two weeks, then reassess  -- Continue sertraline 50mg TID for anxiety and OCD  -- Continue gabapentin 200mg BID for anxiety  -- Follow up 4/24/25 at [facility]

## 2025-02-23 DIAGNOSIS — F42.8 OTHER OBSESSIVE-COMPULSIVE DISORDERS: ICD-10-CM

## 2025-02-23 DIAGNOSIS — F41.1 GENERALIZED ANXIETY DISORDER: Primary | ICD-10-CM

## 2025-02-23 RX ORDER — LORAZEPAM 0.5 MG/1
0.5 TABLET ORAL EVERY MORNING
Qty: 28 TABLET | Refills: 2 | Status: SHIPPED | OUTPATIENT
Start: 2025-02-23

## 2025-02-23 NOTE — PROGRESS NOTES
Updates from agency supervisor/manager (Nahun):    The short-term (2-week) use of morning lorazepam 0.5mg ended on 2/16/25 and by 2/21/25 Evert was exhibiting symptoms of increased anxiety again.  The extra morning dose seems quite helpful, so we will resume it for a more long-term basis, at least until our next appointment.  Mom discussed this with Nahun and was in agreement with the plan.    PLAN  -- Resume lorazepam 0.5mg every morning & 1mg at bedtime for anxiety and OCD for two weeks, then reassess  -- Continue sertraline 50mg TID for anxiety and OCD  -- Continue gabapentin 200mg BID for anxiety  -- Follow up 4/24/25 at [facility]

## 2025-02-24 DIAGNOSIS — G80.1 SPASTIC DIPLEGIC CEREBRAL PALSY (MULTI): ICD-10-CM

## 2025-02-24 DIAGNOSIS — J30.9 ALLERGIC RHINITIS, UNSPECIFIED SEASONALITY, UNSPECIFIED TRIGGER: ICD-10-CM

## 2025-02-24 DIAGNOSIS — J45.909 ASTHMA, UNSPECIFIED ASTHMA SEVERITY, UNSPECIFIED WHETHER COMPLICATED, UNSPECIFIED WHETHER PERSISTENT (HHS-HCC): ICD-10-CM

## 2025-02-25 RX ORDER — MONTELUKAST SODIUM 10 MG/1
TABLET ORAL
Qty: 28 TABLET | Refills: 6 | Status: SHIPPED | OUTPATIENT
Start: 2025-02-25

## 2025-02-25 RX ORDER — BACLOFEN 20 MG/1
TABLET ORAL
Qty: 84 TABLET | Refills: 6 | Status: SHIPPED | OUTPATIENT
Start: 2025-02-25

## 2025-03-21 DIAGNOSIS — Z00.00 HEALTHCARE MAINTENANCE: ICD-10-CM

## 2025-03-24 RX ORDER — MULTIVITAMIN
1 TABLET ORAL DAILY
Qty: 26 TABLET | Refills: 11 | Status: SHIPPED | OUTPATIENT
Start: 2025-03-24

## 2025-04-18 DIAGNOSIS — F42.8 OTHER OBSESSIVE-COMPULSIVE DISORDERS: ICD-10-CM

## 2025-04-18 DIAGNOSIS — F41.1 GENERALIZED ANXIETY DISORDER: ICD-10-CM

## 2025-04-24 ENCOUNTER — APPOINTMENT (OUTPATIENT)
Dept: BEHAVIORAL HEALTH | Facility: CLINIC | Age: 25
End: 2025-04-24
Payer: COMMERCIAL

## 2025-04-24 DIAGNOSIS — G80.1 SPASTIC DIPLEGIC CEREBRAL PALSY (MULTI): ICD-10-CM

## 2025-04-24 DIAGNOSIS — J45.40 MODERATE PERSISTENT ASTHMA, UNSPECIFIED WHETHER COMPLICATED (HHS-HCC): ICD-10-CM

## 2025-04-24 DIAGNOSIS — F42.8 OTHER OBSESSIVE-COMPULSIVE DISORDERS: ICD-10-CM

## 2025-04-24 DIAGNOSIS — F41.1 GENERALIZED ANXIETY DISORDER: Primary | ICD-10-CM

## 2025-04-24 DIAGNOSIS — F84.0 AUTISM SPECTRUM DISORDER (HHS-HCC): ICD-10-CM

## 2025-04-24 PROCEDURE — 99214 OFFICE O/P EST MOD 30 MIN: CPT | Performed by: PSYCHIATRY & NEUROLOGY

## 2025-04-24 PROCEDURE — 1036F TOBACCO NON-USER: CPT | Performed by: PSYCHIATRY & NEUROLOGY

## 2025-04-24 RX ORDER — GABAPENTIN 100 MG/1
200 CAPSULE ORAL 2 TIMES DAILY
Qty: 112 CAPSULE | Refills: 12 | Status: SHIPPED | OUTPATIENT
Start: 2025-04-24

## 2025-04-24 RX ORDER — SERTRALINE HYDROCHLORIDE 50 MG/1
50 TABLET, FILM COATED ORAL 3 TIMES DAILY
COMMUNITY
Start: 2025-03-31 | End: 2025-04-24 | Stop reason: SDUPTHER

## 2025-04-24 RX ORDER — LORAZEPAM 0.5 MG/1
0.5 TABLET ORAL DAILY
Qty: 21 TABLET | Refills: 5 | OUTPATIENT
Start: 2025-04-24

## 2025-04-24 RX ORDER — LORAZEPAM 0.5 MG/1
0.5 TABLET ORAL EVERY MORNING
Qty: 28 TABLET | Refills: 2 | Status: SHIPPED | OUTPATIENT
Start: 2025-04-24

## 2025-04-24 RX ORDER — SERTRALINE HYDROCHLORIDE 50 MG/1
50 TABLET, FILM COATED ORAL 3 TIMES DAILY
Qty: 84 TABLET | Refills: 12 | Status: SHIPPED | OUTPATIENT
Start: 2025-04-24

## 2025-04-24 RX ORDER — LORAZEPAM 1 MG/1
1 TABLET ORAL NIGHTLY
Qty: 28 TABLET | Refills: 2 | Status: SHIPPED | OUTPATIENT
Start: 2025-04-24

## 2025-04-24 ASSESSMENT — ENCOUNTER SYMPTOMS
ACTIVITY CHANGE: 0
SHORTNESS OF BREATH: 0
SLEEP DISTURBANCE: 0
SPEECH DIFFICULTY: 0
SEIZURES: 0
AGITATION: 0
TREMORS: 0
ABDOMINAL PAIN: 0

## 2025-04-24 NOTE — PATIENT INSTRUCTIONS
Anxiety levels seem fairly stable and manageable at this time.  Evert is tolerating and responding well to current treatments.    No medication changes at this time.    Agency will contact office at [phone] to set up next appointment in July.

## 2025-04-24 NOTE — PROGRESS NOTES
Outpatient Adult IDD Psychiatry    A HIPAA-compliant interactive audio and video telecommunication system which permits real time communications between the patient (at the originating site) and provider (at the distant site) was utilized to provide this telehealth service.     The patient, family, caregivers and guardian (as appropriate) have provided consent to conduct treatment via this telehealth service.      The patient's identity and physical location were verified at the time of this visit.     Present for appointment: Evert.    Appointment location: Home.   [address] [place] [address]     SUBJECTIVE    By all accounts, Evert has been doing quite well over the past month or so.  Mood and anxiety problems are very minimal and have responded well to the additional dose of morning lorazepam.  He is going to be starting some counseling in the near future.  He has not had any new or acute health issues or concerns.  He is still attending [employer] [facility] [employer].  He seems to be doing well at home with his staff and house-mate.  I have not gotten many updates from Evert's family and he talks very little about them during today's visit.     Review of Systems   Constitutional:  Negative for activity change.   Respiratory:  Negative for shortness of breath.    Cardiovascular:  Negative for chest pain.   Gastrointestinal:  Negative for abdominal pain.   Neurological:  Negative for tremors, seizures and speech difficulty.   Psychiatric/Behavioral:  Negative for agitation, behavioral problems and sleep disturbance.       Controlled Substance Evaluation  OARRS/PDMP reviewed: Tenzin Brown MD on 4/24/2025  7:00 AM  Is the patient prescribed a combination of a benzodiazepine and opioid? No  I have personally reviewed the OARRS report for Paco Adams.   I have considered the risks of abuse, dependence, addiction and diversion.    I believe that it is clinically appropriate for Paco PEREZ  Angie to be prescribed this medication.    Last Urine Drug Screen:  Recent Results (from the past 8760 hours)   Confirmation Opiate/Opioid/Benzo Prescription Compliance    Collection Time: 10/17/24 10:05 AM   Result Value Ref Range    Clonazepam <25 <25 ng/mL    7-Aminoclonazepam <25 <25 ng/mL    Alprazolam <25 <25 ng/mL    Alpha-Hydroxyalprazolam <25 <25 ng/mL    Midazolam <25 <25 ng/mL    Alpha-Hydroxymidazolam <25 <25 ng/mL    Chlordiazepoxide <25 <25 ng/mL    Diazepam <25 <25 ng/mL    Nordiazepam <25 <25 ng/mL    Temazepam <25 <25 ng/mL    Oxazepam <25 <25 ng/mL    Lorazepam 782 (H) <25 ng/mL    Methadone <25 <25 ng/mL    EDDP <25 <25 ng/mL    6-Acetylmorphine <25 <25 ng/mL    Codeine <50 <50 ng/mL    Hydrocodone <25 <25 ng/mL    Hydromorphone <25 <25 ng/mL    Morphine  <50 <50 ng/mL    Norhydrocodone <25 <25 ng/mL    Noroxycodone <25 <25 ng/mL    Oxycodone <25 <25 ng/mL    Oxymorphone <25 <25 ng/mL    Fentanyl <2.5 <2.5 ng/mL    Norfentanyl <2.5 <2.5 ng/mL    Tramadol <50 <50 ng/mL    O-Desmethyltramadol <50 <50 ng/mL    Zolpidem <25 <25 ng/mL    Zolpidem Metabolite (ZCA) <25 <25 ng/mL   Screen Opiate/Opioid/Benzo Prescription Compliance    Collection Time: 10/17/24 10:05 AM   Result Value Ref Range    Creatinine, Urine Random 136.9 20.0 - 370.0 mg/dL    Amphetamine Screen, Urine Presumptive Negative Presumptive Negative    Barbiturate Screen, Urine Presumptive Negative Presumptive Negative    Cannabinoid Screen, Urine Presumptive Negative Presumptive Negative    Cocaine Metabolite Screen, Urine Presumptive Negative Presumptive Negative    PCP Screen, Urine Presumptive Negative Presumptive Negative     Controlled Substance Agreement: 10/17/2024  Reviewed Controlled Substance Agreement, including but not limited to:  Benefits, risks, and alternatives to treatment with a controlled substance medication(s).    Prescribed Controlled Substances:  > Benzodiazepines: Lorazepam  What is the patient's goal of therapy?  Sleep and reduced anxiety  Is this being achieved with current treatment? Yes  Activities of Daily Living:   Is your overall impression that this patient is benefiting (symptom reduction outweighs side effects) from benzodiazepine therapy? Yes   1. Physical Functioning: Same  2. Family Relationship: Same  3. Social Relationship: Same  4. Mood: Same  5. Sleep Patterns: Same  6. 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   Bx Specialist ?   Nicotine None   Alcohol None   Other drugs None     OBJECTIVE     Lab Results   Component Value Date    HGB 16.5 10/18/2023     10/18/2023    GLUCOSE 85 10/18/2023     10/18/2023    K 4.0 10/18/2023    CO2 26 10/18/2023    CALCIUM 9.7 10/18/2023    CREATININE 0.68 10/18/2023    AST 19 10/18/2023    ALT 15 10/18/2023    CHOL 175 10/18/2023    LDLCALC 113 10/18/2023    TRIG 57 10/18/2023     Therapeutic Drug Monitoring  N/A    Electrocardiograms  None in chart    MENTAL STATUS EXAM  General/Appearance: Appropriate dress/hygiene/grooming.  Attitude/Behavior: Actively engages with interview. Calm/pleasant.  Speech/Communication: Hypertalkative.  Motor: Stimming.  Gait: Not assessed at this visit.  Mood: Appears to be in good spirits.  Affect: Full range.  Thought processes: Perseverative.  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   Perception: Does not appear to be experiencing or responding to hallucinations.  Sensorium/Cognition: Alert, awake,  oriented to person/place/time. Difficulty shifting between topics.  Memory: Recent & remote recall is intact.  Insight: Minimal.  Judgment: Fair. Behaviorally under control.     ASSESSMENT  Evert has generally been doing well.  I don't detect any current evidence of depression or anxiety.  He is responding well to the increased dose of lorazepam.    PROBLEM LIST  ASD  BIANCA  OCD    PLAN  -- Continue lorazepam 0.5mg every morning and 1mg at bedtime for anxiety and OCD  -- Continue sertraline 50mg TID for anxiety and OCD  -- Continue gabapentin 200mg BID for anxiety  -- Follow up 3 months    Tenzin Brown MD    Prep time on date of the patient encounter: 0 minutes   Time spent directly with patient/family/caregiver: 30 minutes   Additional time spent on patient care activities: 0 minutes   Documentation time: 5 minutes   Other time spent: 0 minutes   Total time on date of patient encounter: 35 minutes          [1]   Outpatient Medications Prior to Visit   Medication Sig Dispense Refill    sertraline (Zoloft) 50 mg tablet Take 1 tablet (50 mg) by mouth 3 times a day.      acetaminophen (Tylenol) 325 mg tablet Take as needed q6 hours for pain 90 tablet 3    albuterol (Ventolin HFA) 90 mcg/actuation inhaler Inhale 2 puffs every 4 hours if needed for wheezing or shortness of breath. 8 g 11    baclofen (Lioresal) 20 mg tablet 1 TAB BY MOUTH THREE TIMES A DAY WITH MEALS 84 tablet 6    Blistex Medicated 0.6-0.5-1.1-0.5 % ointment ointment Use as needed for blisters 6 g 11    cetirizine (ZyrTEC) 10 mg tablet 1 TAB BY MOUTH ONCE A DAY WITH BREAKFAST 28 tablet 10    Cough Drops 5.8 mg lozenge Use daily as needed for cough 80 lozenge 3    docusate sodium (Colace) 100 mg capsule 1 capsules by mouth daily 60 capsule 11    gabapentin (Neurontin) 100 mg capsule Take 2 capsules (200 mg) by mouth 2 times a day. 120 capsule 11    LORazepam (Ativan) 0.5 mg tablet Take 1 tablet (0.5 mg) by mouth once daily in the morning. 28 tablet  2    LORazepam (Ativan) 1 mg tablet Take 1 tablet (1 mg) by mouth once daily at bedtime. 28 tablet 2    Milk of Magnesia 400 mg/5 mL suspension Take 5 mL by mouth once daily as needed for constipation. 360 mL 11    montelukast (Singulair) 10 mg tablet 1 TAB BY MOUTH DAILY AT BEDTIME 28 tablet 6    Mucinex DM  mg 12 hr tablet Take 1 tablet by mouth every 12 hours if needed for cough (cough). Do not crush, chew, or split. 90 tablet 3    multivitamin tablet 1 TAB BY MOUTH ONCE A DAY 26 tablet 11    polyethylene glycol (Glycolax, Miralax) 17 gram/dose powder One half capful in 8 ounces of fluid every other day. 850 g 11    pramoxine-calamine (Calamine Plus, pramox-calamin,) 1-8 % lotion Use as needed for dermatitis 177 mL 3    sertraline (Zoloft) 100 mg tablet Take 0.5 tablets (50 mg) by mouth 3 times a day. 45 tablet 11    triamcinolone (Kenalog) 0.1 % cream Apply topically 2 times a day. 400 g 2    triamcinolone (Nasacort) 55 mcg nasal inhaler 2 SPRAYS INTO EACH NOSTRIL ONCE A DAY 16.9 mL 10     No facility-administered medications prior to visit.

## 2025-05-12 ENCOUNTER — APPOINTMENT (OUTPATIENT)
Dept: PRIMARY CARE | Facility: CLINIC | Age: 25
End: 2025-05-12
Payer: COMMERCIAL

## 2025-05-12 VITALS
TEMPERATURE: 97.7 F | SYSTOLIC BLOOD PRESSURE: 128 MMHG | HEIGHT: 65 IN | OXYGEN SATURATION: 98 % | DIASTOLIC BLOOD PRESSURE: 72 MMHG | BODY MASS INDEX: 24.83 KG/M2 | WEIGHT: 149 LBS | HEART RATE: 99 BPM

## 2025-05-12 DIAGNOSIS — Z00.00 HEALTHCARE MAINTENANCE: ICD-10-CM

## 2025-05-12 DIAGNOSIS — J30.89 SEASONAL ALLERGIC RHINITIS DUE TO OTHER ALLERGIC TRIGGER: ICD-10-CM

## 2025-05-12 DIAGNOSIS — J45.40 MODERATE PERSISTENT ASTHMA, UNSPECIFIED WHETHER COMPLICATED (HHS-HCC): ICD-10-CM

## 2025-05-12 DIAGNOSIS — G80.1 SPASTIC DIPLEGIC CEREBRAL PALSY (MULTI): ICD-10-CM

## 2025-05-12 DIAGNOSIS — F42.8 OTHER OBSESSIVE-COMPULSIVE DISORDERS: ICD-10-CM

## 2025-05-12 DIAGNOSIS — J45.909 ASTHMA, UNSPECIFIED ASTHMA SEVERITY, UNSPECIFIED WHETHER COMPLICATED, UNSPECIFIED WHETHER PERSISTENT (HHS-HCC): ICD-10-CM

## 2025-05-12 DIAGNOSIS — J30.9 ALLERGIC RHINITIS, UNSPECIFIED SEASONALITY, UNSPECIFIED TRIGGER: ICD-10-CM

## 2025-05-12 DIAGNOSIS — K59.00 CONSTIPATION, UNSPECIFIED CONSTIPATION TYPE: ICD-10-CM

## 2025-05-12 DIAGNOSIS — F41.1 GENERALIZED ANXIETY DISORDER: ICD-10-CM

## 2025-05-12 DIAGNOSIS — F84.0 AUTISM SPECTRUM DISORDER (HHS-HCC): Primary | ICD-10-CM

## 2025-05-12 PROCEDURE — 99395 PREV VISIT EST AGE 18-39: CPT | Performed by: STUDENT IN AN ORGANIZED HEALTH CARE EDUCATION/TRAINING PROGRAM

## 2025-05-12 PROCEDURE — 3008F BODY MASS INDEX DOCD: CPT | Performed by: STUDENT IN AN ORGANIZED HEALTH CARE EDUCATION/TRAINING PROGRAM

## 2025-05-12 PROCEDURE — 1036F TOBACCO NON-USER: CPT | Performed by: STUDENT IN AN ORGANIZED HEALTH CARE EDUCATION/TRAINING PROGRAM

## 2025-05-12 RX ORDER — MONTELUKAST SODIUM 10 MG/1
10 TABLET ORAL NIGHTLY
Qty: 90 TABLET | Refills: 3 | Status: SHIPPED | OUTPATIENT
Start: 2025-05-12 | End: 2026-05-12

## 2025-05-12 RX ORDER — DIMETHICONE, CAMPHOR (SYNTHETIC), MENTHOL, AND PHENOL 1.1; .5; .625; .5 G/100G; G/100G; G/100G; G/100G
OINTMENT TOPICAL
Qty: 6 G | Refills: 11 | Status: SHIPPED | OUTPATIENT
Start: 2025-05-12

## 2025-05-12 RX ORDER — BACLOFEN 20 MG/1
20 TABLET ORAL 3 TIMES DAILY
Qty: 270 TABLET | Refills: 3 | Status: SHIPPED | OUTPATIENT
Start: 2025-05-12 | End: 2026-05-12

## 2025-05-12 RX ORDER — DOCUSATE SODIUM 100 MG/1
CAPSULE, LIQUID FILLED ORAL
Qty: 60 CAPSULE | Refills: 11 | Status: SHIPPED | OUTPATIENT
Start: 2025-05-12

## 2025-05-12 RX ORDER — ALBUTEROL SULFATE 90 UG/1
2 INHALANT RESPIRATORY (INHALATION) EVERY 4 HOURS PRN
Qty: 8 G | Refills: 11 | Status: SHIPPED | OUTPATIENT
Start: 2025-05-12 | End: 2026-05-12

## 2025-05-12 RX ORDER — MULTIVITAMIN
1 TABLET ORAL DAILY
Qty: 90 TABLET | Refills: 3 | Status: SHIPPED | OUTPATIENT
Start: 2025-05-12 | End: 2026-05-12

## 2025-05-12 RX ORDER — GUAIFENESIN AND DEXTROMETHORPHAN HYDROBROMIDE 600; 30 MG/1; MG/1
1 TABLET, EXTENDED RELEASE ORAL EVERY 12 HOURS PRN
Qty: 90 TABLET | Refills: 3 | Status: SHIPPED | OUTPATIENT
Start: 2025-05-12

## 2025-05-12 RX ORDER — BENZOCAINE 20 %
PASTE (GRAM) MUCOUS MEMBRANE
Qty: 177 ML | Refills: 3 | Status: SHIPPED | OUTPATIENT
Start: 2025-05-12

## 2025-05-12 RX ORDER — TOOTHBRUSH
EACH DENTAL
Qty: 80 LOZENGE | Refills: 3 | Status: SHIPPED | OUTPATIENT
Start: 2025-05-12

## 2025-05-12 RX ORDER — ACETAMINOPHEN 325 MG/1
TABLET ORAL
Qty: 90 TABLET | Refills: 3 | Status: SHIPPED | OUTPATIENT
Start: 2025-05-12

## 2025-05-12 RX ORDER — ASCORBIC ACID 1000 MG
5 TABLET ORAL DAILY PRN
Qty: 360 ML | Refills: 11 | Status: SHIPPED | OUTPATIENT
Start: 2025-05-12

## 2025-05-12 RX ORDER — TRIAMCINOLONE ACETONIDE 55 UG/1
2 SPRAY, METERED NASAL DAILY
Qty: 16.9 ML | Refills: 10 | Status: SHIPPED | OUTPATIENT
Start: 2025-05-12

## 2025-05-12 RX ORDER — POLYETHYLENE GLYCOL 3350 17 G/17G
POWDER, FOR SOLUTION ORAL
Qty: 850 G | Refills: 11 | Status: SHIPPED | OUTPATIENT
Start: 2025-05-12

## 2025-05-12 ASSESSMENT — PAIN SCALES - GENERAL: PAINLEVEL_OUTOF10: 0-NO PAIN

## 2025-05-12 NOTE — PATIENT INSTRUCTIONS
Please stop at any  lab ([address] if you choose to use my building) to complete your blood and/or urine work that I've ordered for you.    I will contact you with the results at my soonest convenience. I strongly urge you to use [employer] as this is the quickest and easiest way to access your results and receive my correspondences.     I will fill out forms when you send them.    Follow up with your specialists as previously scheduled.     See me yearly for a complete physical exam, and sooner as needed for sick visits

## 2025-05-12 NOTE — PROGRESS NOTES
"Subjective   Patient ID: Paco Adams is a 24 y.o. male who presents for No chief complaint on file..  History of Present Illness  Here today for CPE. With helper Angélica.     Re: CP - see PM+R notes. Uses AFOs. Used to have baclofen pump; this was complicated by infection requiring surgery, now just on oral baclofen and gets therapy regularly. Has myoclonus on exam and needs walking crutches and AFOs to ambulate.     Re: Psych - h/o autism. On several medications, follows with Dr. Curtis. Stable on current medications. Lives in group home, has essentially 24hr care.     Re: other - seen at [facility].       PMHx, FHx, Social Hx, Surg Hx personally reviewed at this appointment. No pertinent findings and/or changes from prior (if applicable).    ROS: Unless specified above, pt denies wt gain/loss f/c HA LoC CP SOB NVDC. See HPI above, and scanned sheet (if applicable). All other systems are reviewed and are without complaint.     Objective     /72   Pulse 99   Temp 36.5 °C (97.7 °F)   Ht 1.651 m (5' 5\")   Wt 67.6 kg (149 lb)   SpO2 98%   BMI 24.79 kg/m²      Physical Exam  Gen: well developed in NAD. AAO x3.   HEENT: NC/AT. Anicteric sclera, symmetric pupils. MMM no thrush.  Neck: Soft, supple. No LAD. No goiter.   CV: RRR nl s1s2 no m/r/g  Pulm: CTAB no w/r/r, good air exchange  GI: soft NTND BS+ no hsm. Well healed scars from prior surgeries.   Ext: WWP no edema  Neuro: brisk bilateral reflexes UE and LE. Myoclonus on ankle jerk bilaterally   MSK: Spastic muscles throughout, worse LE.   Gait:  unsteady gait with crutches and AFOs  Psych: fair insight. Tangential discussions however.  Skin: two areas (back of head, R foot dorsal aspect) with mild dermatitis.       Lab Results   Component Value Date    WBC 3.6 (L) 10/18/2023    HGB 16.5 10/18/2023    HCT 49.6 10/18/2023     10/18/2023    CHOL 175 10/18/2023    TRIG 57 10/18/2023    HDL 50.7 10/18/2023    ALT 15 " 10/18/2023    AST 19 10/18/2023     10/18/2023    K 4.0 10/18/2023     10/18/2023    CREATININE 0.68 10/18/2023    BUN 12 10/18/2023    CO2 26 10/18/2023     par     Current Outpatient Medications   Medication Instructions    acetaminophen (Tylenol) 325 mg tablet Take as needed q6 hours for pain    albuterol (Ventolin HFA) 90 mcg/actuation inhaler 2 puffs, inhalation, Every 4 hours PRN    baclofen (Lioresal) 20 mg tablet 1 TAB BY MOUTH THREE TIMES A DAY WITH MEALS    Blistex Medicated 0.6-0.5-1.1-0.5 % ointment ointment Use as needed for blisters    cetirizine (ZyrTEC) 10 mg tablet 1 TAB BY MOUTH ONCE A DAY WITH BREAKFAST    Cough Drops 5.8 mg lozenge Use daily as needed for cough    docusate sodium (Colace) 100 mg capsule 1 capsules by mouth daily    gabapentin (NEURONTIN) 200 mg, oral, 2 times daily    LORazepam (ATIVAN) 0.5 mg, oral, Every morning    LORazepam (ATIVAN) 1 mg, oral, Nightly    Milk of Magnesia 400 mg/5 mL suspension 5 mL, oral, Daily PRN    montelukast (Singulair) 10 mg tablet 1 TAB BY MOUTH DAILY AT BEDTIME    Mucinex DM  mg 12 hr tablet 1 tablet, oral, Every 12 hours PRN, Do not crush, chew, or split.    multivitamin tablet 1 tablet, oral, Daily    polyethylene glycol (Glycolax, Miralax) 17 gram/dose powder One half capful in 8 ounces of fluid every other day.    pramoxine-calamine (Calamine Plus, pramox-calamin,) 1-8 % lotion Use as needed for dermatitis    sertraline (ZOLOFT) 50 mg, oral, 3 times daily    triamcinolone (Kenalog) 0.1 % cream Topical, 2 times daily    triamcinolone (Nasacort) 55 mcg nasal inhaler 2 sprays, Each Nostril, Daily        WRIST  MRN: [id number]  Patient Name: WENDI ESPOSITO     STUDY:  WRIST COMPLT; MIN 3 VIEWS;  1/26/2021 9:15 am     INDICATION:  PA/LAT/OBLIQUE. Follow-up lunate fracture     COMPARISON:  Prior exam is from 01/19/2021.  .     ACCESSION NUMBER(S):  [id number]     ORDERING CLINICIAN:  SANDY MONROY     TECHNIQUE:  3 views  of the   right wrist were obtained.     FINDINGS:  No significant osteophytic change.  No lytic or blastic destructive bone lesion.  There was a comminuted acute nondisplaced fracture in the lunate on  the prior exam. Today, the fracture lines are evident but there  appears to be mild collapse of the distal articular surface of the  lunate, with slight interval increase in overall bone density of the  lunate. No other fracture. No dislocation..  No opaque soft tissue foreign body.  No periosteal reaction or erosion.     IMPRESSION:  Mild adverse interval change in position and alignment of lunate  fracture fragments compared to the prior exam, with mild interval  collapse of the distal articular surface of the lunate. Mild  increased bone density is likely due to a combination of this and  also some degree of interval bony healing. No other change.           Assessment & Plan  Longstanding chronic issues stemming from CP, autism, and OCD/anxiety. Well managed and stable on current meds. Dermatitis improving, doing well on steroid cream. Meds renewed. Lab work is current. Paperwork for group home not with him today, will do this.      # Dermatitis  - steroid cream continued  - referral to dermatology if does not improve with this     # Cerebral Palsy with spasticity  - continue current meds  - use AFOs to walk  - follow up PM&R     # Depression and/or Anxiety  - continue current meds  - follow up psych     # Constipation  - continue current meds     # Health Maintenance  - routine blood work  - Colon Cancer Screening: not indicated   - PSA:  not indicated   - Immunizations:  flu shot yearly  - AAA screening:  not indicated           Duy Ennis MD       This medical note was created with the assistance of artificial intelligence (AI) for documentation purposes. The content has been reviewed and confirmed by the healthcare provider for accuracy and completeness. Patient consented to the use of audio recording and use of  AI during their visit.

## 2025-06-14 DIAGNOSIS — F42.8 OTHER OBSESSIVE-COMPULSIVE DISORDERS: ICD-10-CM

## 2025-06-14 DIAGNOSIS — F41.1 GENERALIZED ANXIETY DISORDER: ICD-10-CM

## 2025-06-15 RX ORDER — LORAZEPAM 0.5 MG/1
0.5 TABLET ORAL EVERY MORNING
Qty: 28 TABLET | Refills: 1 | Status: SHIPPED | OUTPATIENT
Start: 2025-06-15

## 2025-06-15 RX ORDER — LORAZEPAM 1 MG/1
1 TABLET ORAL NIGHTLY
Qty: 28 TABLET | Refills: 1 | Status: SHIPPED | OUTPATIENT
Start: 2025-06-15

## 2025-07-25 ENCOUNTER — OFFICE VISIT (OUTPATIENT)
Dept: URGENT CARE | Age: 25
End: 2025-07-25
Payer: COMMERCIAL

## 2025-07-25 VITALS
RESPIRATION RATE: 16 BRPM | HEART RATE: 88 BPM | SYSTOLIC BLOOD PRESSURE: 128 MMHG | OXYGEN SATURATION: 98 % | TEMPERATURE: 98 F | DIASTOLIC BLOOD PRESSURE: 84 MMHG

## 2025-07-25 DIAGNOSIS — Z48.02 ENCOUNTER FOR STAPLE REMOVAL: Primary | ICD-10-CM

## 2025-07-25 PROCEDURE — 99024 POSTOP FOLLOW-UP VISIT: CPT | Performed by: NURSE PRACTITIONER

## 2025-07-25 ASSESSMENT — ENCOUNTER SYMPTOMS: WOUND: 1

## 2025-07-25 NOTE — PROGRESS NOTES
Subjective   Patient ID: Paco Adams is a 25 y.o. male. They present today with a chief complaint of Suture / Staple Removal (Placed last Monday).    History of Present Illness  The caregiver states that the patient injured the scalp at [place] about 10 days ago.  The patient had 2 staples placed at another healthcare facility about 10 days ago.  The caregiver states the wound has been healing well, no bleeding discharge or tenderness.      History provided by:  Patient and caregiver  History limited by: The patient has autism spectrum disorder.   used: No        Past Medical History  Allergies as of 07/25/2025 - Reviewed 07/25/2025   Allergen Reaction Noted    Barbiturates Other and Unknown 07/30/2012    Chloroquine Other 05/04/2021    Sulfa (sulfonamide antibiotics) Other 07/30/2012       Prescriptions Prior to Admission[1]     Medical History[2]    Surgical History[3]     reports that he has never smoked. He has never used smokeless tobacco. He reports that he does not drink alcohol and does not use drugs.    Review of Systems  Review of Systems   Skin:  Positive for wound.       Objective    Vitals:    07/25/25 1602   BP: 128/84   Pulse: 88   Resp: 16   Temp: 36.7 °C (98 °F)   SpO2: 98%     No LMP for male patient.    Physical Exam    Suture Removal    Date/Time: 7/25/2025 4:10 PM    Performed by: JUSTICE Julio  Authorized by: JUSTICE Julio    Consent:     Consent obtained:  Verbal    Consent given by:  Patient (Caregiver)    Risks, benefits, and alternatives were discussed: yes      Risks discussed:  Bleeding, pain and wound separation    Alternatives discussed:  Observation  Universal protocol:     Procedure explained and questions answered to patient or proxy's satisfaction: yes      Relevant documents present and verified: no      Test results available: no      Imaging studies available: no      Required blood products, implants, devices, and special equipment  available: no      Site/side marked: no      Immediately prior to procedure, a time out was called: yes      Patient identity confirmed:  Verbally with patient (Caregiver)  Location:     Location:  Head/neck    Head/neck location:  Scalp  Procedure details:     Wound appearance:  No signs of infection and clean    Number of staples removed:  2  Post-procedure details:     Post-removal:  No dressing applied    Procedure completion:  Tolerated well, no immediate complications      Point of Care Test & Imaging Results from this visit  No results found for this visit on 07/25/25.   Imaging  No results found.    Cardiology, Vascular, and Other Imaging  No other imaging results found for the past 2 days      Diagnostic study results (if any) were reviewed by JUSTICE Julio.    Assessment/Plan   Allergies, medications, history, and pertinent labs/EKGs/Imaging reviewed by JUSTICE Julio.     Medical Decision Making  Skin care discussed. Wash with soap and water daily and pat dry. Leave open to room air, if no drainage. Apply dressing if draining.  Monitor for signs and symptoms of worsening infection including pain, redness, swelling, red streaks following veins, discharge and fever. Call 911 or go to the nearest ER.  The caregiver verbalized understanding the patient left in stable condition.      Orders and Diagnoses  Diagnoses and all orders for this visit:  Encounter for staple removal  Other orders  -     Suture Removal      Medical Admin Record      Patient disposition: Home    Electronically signed by JUSTICE Julio  4:14 PM           [1] (Not in a hospital admission)   [2]   Past Medical History:  Diagnosis Date    Autism ([facility]) 02/18/2013    Cellulitis and abscess of toe of right foot 01/22/2020    Last Assessment & Plan: Formatting of this note is different from the original. Assessment: 20yo ex 26 week premature infant M with a PMHx significant for PVL, cerebral palsy, autism, OCD, and  asthma who presented to the ED on  with a 5 day hx of R hallux valgus pain, erythema, lymphatic streaking up dorsal aspect of foot, and swelling, found to have large ulcer on medial aspect of toe. 8cc pu    Cellulitis of right toe 2020    Cellulitis of toe of right foot    Congenital diplegia ([facility]) 2015    Constipation 2014    CSF leak 2018    Formatting of this note might be different from the original. Added automatically from request for surgery [id number]    Displaced fracture of lunate (semilunar), right wrist, initial encounter for closed fracture 2021    Closed displaced fracture of lunate of right wrist, initial encounter    Dystonia 2012    Femoral anteversion ([facility]) 2016    Gait disturbance 2012    Mood disorder 2020    Last Assessment & Plan: Formatting of this note might be different from the original. Assessment: Chronic, followed outpatient. 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

## 2025-08-09 DIAGNOSIS — F42.8 OTHER OBSESSIVE-COMPULSIVE DISORDERS: ICD-10-CM

## 2025-08-09 DIAGNOSIS — F41.1 GENERALIZED ANXIETY DISORDER: ICD-10-CM

## 2025-08-11 ENCOUNTER — APPOINTMENT (OUTPATIENT)
Dept: PHYSICAL MEDICINE AND REHAB | Facility: CLINIC | Age: 25
End: 2025-08-11
Payer: COMMERCIAL

## 2025-08-11 VITALS
HEART RATE: 78 BPM | TEMPERATURE: 98.2 F | OXYGEN SATURATION: 93 % | DIASTOLIC BLOOD PRESSURE: 70 MMHG | BODY MASS INDEX: 24.16 KG/M2 | HEIGHT: 65 IN | SYSTOLIC BLOOD PRESSURE: 111 MMHG | WEIGHT: 145 LBS

## 2025-08-11 DIAGNOSIS — R26.9 GAIT DISTURBANCE: Primary | ICD-10-CM

## 2025-08-11 DIAGNOSIS — G80.1 SPASTIC DIPLEGIC CEREBRAL PALSY (MULTI): ICD-10-CM

## 2025-08-11 PROCEDURE — 3008F BODY MASS INDEX DOCD: CPT | Performed by: PHYSICAL MEDICINE & REHABILITATION

## 2025-08-11 PROCEDURE — 99213 OFFICE O/P EST LOW 20 MIN: CPT | Performed by: PHYSICAL MEDICINE & REHABILITATION

## 2025-08-11 ASSESSMENT — PAIN SCALES - GENERAL: PAINLEVEL_OUTOF10: 0-NO PAIN

## 2025-08-13 RX ORDER — LORAZEPAM 1 MG/1
1 TABLET ORAL NIGHTLY
Qty: 28 TABLET | Refills: 2 | Status: SHIPPED | OUTPATIENT
Start: 2025-08-13

## 2025-08-13 RX ORDER — LORAZEPAM 0.5 MG/1
0.5 TABLET ORAL EVERY MORNING
Qty: 28 TABLET | Refills: 2 | Status: SHIPPED | OUTPATIENT
Start: 2025-08-13

## 2025-08-13 RX ORDER — GABAPENTIN 100 MG/1
200 CAPSULE ORAL 2 TIMES DAILY
Qty: 112 CAPSULE | Refills: 6 | Status: SHIPPED | OUTPATIENT
Start: 2025-08-13

## 2025-08-13 RX ORDER — SERTRALINE HYDROCHLORIDE 50 MG/1
TABLET, FILM COATED ORAL
Qty: 84 TABLET | Refills: 6 | Status: SHIPPED | OUTPATIENT
Start: 2025-08-13

## 2025-08-27 DIAGNOSIS — G80.1 SPASTIC DIPLEGIC CEREBRAL PALSY (MULTI): ICD-10-CM

## 2025-08-27 DIAGNOSIS — R26.9 GAIT DISTURBANCE: Primary | ICD-10-CM

## 2025-08-28 ENCOUNTER — TELEPHONE (OUTPATIENT)
Dept: PHYSICAL MEDICINE AND REHAB | Facility: CLINIC | Age: 25
End: 2025-08-28
Payer: COMMERCIAL

## 2025-09-01 ENCOUNTER — PATIENT MESSAGE (OUTPATIENT)
Dept: PRIMARY CARE | Facility: CLINIC | Age: 25
End: 2025-09-01
Payer: COMMERCIAL

## 2025-09-01 DIAGNOSIS — K59.00 CONSTIPATION, UNSPECIFIED CONSTIPATION TYPE: ICD-10-CM

## 2025-09-02 RX ORDER — POLYETHYLENE GLYCOL 3350 17 G/17G
POWDER, FOR SOLUTION ORAL
Qty: 850 G | Refills: 11 | Status: SHIPPED | OUTPATIENT
Start: 2025-09-02

## 2025-09-04 DIAGNOSIS — G80.1 SPASTIC DIPLEGIC CEREBRAL PALSY (MULTI): ICD-10-CM

## 2025-09-04 DIAGNOSIS — R26.9 GAIT DISTURBANCE: Primary | ICD-10-CM

## 2025-09-05 ENCOUNTER — TELEPHONE (OUTPATIENT)
Dept: PHYSICAL MEDICINE AND REHAB | Facility: CLINIC | Age: 25
End: 2025-09-05
Payer: COMMERCIAL